# Patient Record
Sex: MALE | Race: WHITE | NOT HISPANIC OR LATINO | Employment: FULL TIME | URBAN - NONMETROPOLITAN AREA
[De-identification: names, ages, dates, MRNs, and addresses within clinical notes are randomized per-mention and may not be internally consistent; named-entity substitution may affect disease eponyms.]

---

## 2023-06-01 PROBLEM — J30.9 ALLERGIC RHINITIS, MILD: Status: ACTIVE | Noted: 2023-06-01

## 2023-06-01 PROBLEM — F41.9 MILD ANXIETY: Status: ACTIVE | Noted: 2023-06-01

## 2023-06-01 PROBLEM — U07.1 COVID-19 VIRUS INFECTION: Status: ACTIVE | Noted: 2023-06-01

## 2023-06-01 PROBLEM — Z86.0100 HISTORY OF COLON POLYPS: Status: ACTIVE | Noted: 2023-06-01

## 2023-06-01 PROBLEM — Z86.010 HISTORY OF COLON POLYPS: Status: ACTIVE | Noted: 2023-06-01

## 2023-06-01 PROBLEM — E78.5 DYSLIPIDEMIA: Status: ACTIVE | Noted: 2023-06-01

## 2023-06-01 PROBLEM — R43.0 ANOSMIA: Status: ACTIVE | Noted: 2023-06-01

## 2023-06-01 PROBLEM — E78.6 LOW HDL (UNDER 40): Status: ACTIVE | Noted: 2023-06-01

## 2023-06-01 PROBLEM — G47.00 INSOMNIA: Status: ACTIVE | Noted: 2023-06-01

## 2023-06-01 PROBLEM — Z86.39 H/O VITAMIN D DEFICIENCY: Status: ACTIVE | Noted: 2023-06-01

## 2023-06-01 PROBLEM — R41.3 MEMORY LOSS OR IMPAIRMENT: Status: ACTIVE | Noted: 2023-06-01

## 2023-06-01 RX ORDER — UBIQUINOL 100 MG
CAPSULE ORAL
COMMUNITY

## 2023-06-01 RX ORDER — FLUOXETINE HYDROCHLORIDE 40 MG/1
2 CAPSULE ORAL DAILY
COMMUNITY
Start: 2018-06-01 | End: 2023-08-16 | Stop reason: SDUPTHER

## 2023-06-01 RX ORDER — GARLIC 1000 MG
CAPSULE ORAL
COMMUNITY
End: 2024-04-03 | Stop reason: ALTCHOICE

## 2023-06-01 RX ORDER — RED YEAST RICE EXTRACT
POWDER (GRAM) MISCELLANEOUS
COMMUNITY
End: 2024-04-03 | Stop reason: ALTCHOICE

## 2023-06-01 RX ORDER — ACETAMINOPHEN 500 MG
TABLET ORAL
COMMUNITY
End: 2024-04-05

## 2023-06-02 ENCOUNTER — APPOINTMENT (OUTPATIENT)
Dept: PRIMARY CARE | Facility: CLINIC | Age: 43
End: 2023-06-02

## 2023-06-07 PROBLEM — R43.0 ANOSMIA: Status: RESOLVED | Noted: 2023-06-01 | Resolved: 2023-06-07

## 2023-06-07 PROBLEM — U07.1 COVID-19 VIRUS INFECTION: Status: RESOLVED | Noted: 2023-06-01 | Resolved: 2023-06-07

## 2023-06-07 ASSESSMENT — PROMIS GLOBAL HEALTH SCALE
RATE_AVERAGE_PAIN: 0
RATE_MENTAL_HEALTH: VERY GOOD
CARRYOUT_PHYSICAL_ACTIVITIES: COMPLETELY
RATE_QUALITY_OF_LIFE: EXCELLENT
RATE_AVERAGE_FATIGUE: MILD
RATE_PHYSICAL_HEALTH: EXCELLENT
RATE_GENERAL_HEALTH: EXCELLENT
RATE_SOCIAL_SATISFACTION: VERY GOOD
EMOTIONAL_PROBLEMS: RARELY
CARRYOUT_SOCIAL_ACTIVITIES: EXCELLENT

## 2023-06-08 ENCOUNTER — LAB (OUTPATIENT)
Dept: LAB | Facility: LAB | Age: 43
End: 2023-06-08
Payer: COMMERCIAL

## 2023-06-08 ENCOUNTER — OFFICE VISIT (OUTPATIENT)
Dept: PRIMARY CARE | Facility: CLINIC | Age: 43
End: 2023-06-08
Payer: COMMERCIAL

## 2023-06-08 VITALS
DIASTOLIC BLOOD PRESSURE: 68 MMHG | TEMPERATURE: 96.8 F | OXYGEN SATURATION: 98 % | HEIGHT: 73 IN | HEART RATE: 54 BPM | BODY MASS INDEX: 33.83 KG/M2 | WEIGHT: 255.3 LBS | SYSTOLIC BLOOD PRESSURE: 119 MMHG

## 2023-06-08 DIAGNOSIS — Z13.0 SCREENING, ANEMIA, DEFICIENCY, IRON: ICD-10-CM

## 2023-06-08 DIAGNOSIS — E78.6 LOW HDL (UNDER 40): ICD-10-CM

## 2023-06-08 DIAGNOSIS — Z00.00 PHYSICAL EXAM, ANNUAL: Primary | ICD-10-CM

## 2023-06-08 DIAGNOSIS — F41.9 MILD ANXIETY: ICD-10-CM

## 2023-06-08 DIAGNOSIS — E78.5 DYSLIPIDEMIA: ICD-10-CM

## 2023-06-08 DIAGNOSIS — Z86.010 HISTORY OF COLON POLYPS: ICD-10-CM

## 2023-06-08 DIAGNOSIS — Z00.00 PHYSICAL EXAM, ANNUAL: ICD-10-CM

## 2023-06-08 PROBLEM — R41.3 MEMORY LOSS OR IMPAIRMENT: Status: RESOLVED | Noted: 2023-06-01 | Resolved: 2023-06-08

## 2023-06-08 PROBLEM — Z86.39 H/O VITAMIN D DEFICIENCY: Status: RESOLVED | Noted: 2023-06-01 | Resolved: 2023-06-08

## 2023-06-08 LAB
ALANINE AMINOTRANSFERASE (SGPT) (U/L) IN SER/PLAS: 55 U/L (ref 10–52)
ALBUMIN (G/DL) IN SER/PLAS: 4.8 G/DL (ref 3.4–5)
ALKALINE PHOSPHATASE (U/L) IN SER/PLAS: 61 U/L (ref 33–120)
ANION GAP IN SER/PLAS: 14 MMOL/L (ref 10–20)
ASPARTATE AMINOTRANSFERASE (SGOT) (U/L) IN SER/PLAS: 22 U/L (ref 9–39)
BASOPHILS (10*3/UL) IN BLOOD BY AUTOMATED COUNT: 0.02 X10E9/L (ref 0–0.1)
BASOPHILS/100 LEUKOCYTES IN BLOOD BY AUTOMATED COUNT: 0.3 % (ref 0–2)
BILIRUBIN TOTAL (MG/DL) IN SER/PLAS: 0.7 MG/DL (ref 0–1.2)
CALCIUM (MG/DL) IN SER/PLAS: 10.1 MG/DL (ref 8.6–10.6)
CARBON DIOXIDE, TOTAL (MMOL/L) IN SER/PLAS: 28 MMOL/L (ref 21–32)
CHLORIDE (MMOL/L) IN SER/PLAS: 104 MMOL/L (ref 98–107)
CHOLESTEROL (MG/DL) IN SER/PLAS: 172 MG/DL (ref 0–199)
CHOLESTEROL IN HDL (MG/DL) IN SER/PLAS: 46 MG/DL
CHOLESTEROL/HDL RATIO: 3.7
CREATININE (MG/DL) IN SER/PLAS: 1.09 MG/DL (ref 0.5–1.3)
EOSINOPHILS (10*3/UL) IN BLOOD BY AUTOMATED COUNT: 0.12 X10E9/L (ref 0–0.7)
EOSINOPHILS/100 LEUKOCYTES IN BLOOD BY AUTOMATED COUNT: 1.9 % (ref 0–6)
ERYTHROCYTE DISTRIBUTION WIDTH (RATIO) BY AUTOMATED COUNT: 12.6 % (ref 11.5–14.5)
ERYTHROCYTE MEAN CORPUSCULAR HEMOGLOBIN CONCENTRATION (G/DL) BY AUTOMATED: 32.8 G/DL (ref 32–36)
ERYTHROCYTE MEAN CORPUSCULAR VOLUME (FL) BY AUTOMATED COUNT: 94 FL (ref 80–100)
ERYTHROCYTES (10*6/UL) IN BLOOD BY AUTOMATED COUNT: 5.6 X10E12/L (ref 4.5–5.9)
GFR MALE: 87 ML/MIN/1.73M2
GLUCOSE (MG/DL) IN SER/PLAS: 88 MG/DL (ref 74–99)
HEMATOCRIT (%) IN BLOOD BY AUTOMATED COUNT: 52.7 % (ref 41–52)
HEMOGLOBIN (G/DL) IN BLOOD: 17.3 G/DL (ref 13.5–17.5)
IMMATURE GRANULOCYTES/100 LEUKOCYTES IN BLOOD BY AUTOMATED COUNT: 0.2 % (ref 0–0.9)
LDL: 108 MG/DL (ref 0–99)
LEUKOCYTES (10*3/UL) IN BLOOD BY AUTOMATED COUNT: 6.2 X10E9/L (ref 4.4–11.3)
LYMPHOCYTES (10*3/UL) IN BLOOD BY AUTOMATED COUNT: 2.6 X10E9/L (ref 1.2–4.8)
LYMPHOCYTES/100 LEUKOCYTES IN BLOOD BY AUTOMATED COUNT: 41.7 % (ref 13–44)
MONOCYTES (10*3/UL) IN BLOOD BY AUTOMATED COUNT: 0.48 X10E9/L (ref 0.1–1)
MONOCYTES/100 LEUKOCYTES IN BLOOD BY AUTOMATED COUNT: 7.7 % (ref 2–10)
NEUTROPHILS (10*3/UL) IN BLOOD BY AUTOMATED COUNT: 3.01 X10E9/L (ref 1.2–7.7)
NEUTROPHILS/100 LEUKOCYTES IN BLOOD BY AUTOMATED COUNT: 48.2 % (ref 40–80)
NRBC (PER 100 WBCS) BY AUTOMATED COUNT: 0 /100 WBC (ref 0–0)
PLATELETS (10*3/UL) IN BLOOD AUTOMATED COUNT: 224 X10E9/L (ref 150–450)
POTASSIUM (MMOL/L) IN SER/PLAS: 5.2 MMOL/L (ref 3.5–5.3)
PROTEIN TOTAL: 7.1 G/DL (ref 6.4–8.2)
SODIUM (MMOL/L) IN SER/PLAS: 141 MMOL/L (ref 136–145)
TRIGLYCERIDE (MG/DL) IN SER/PLAS: 91 MG/DL (ref 0–149)
UREA NITROGEN (MG/DL) IN SER/PLAS: 12 MG/DL (ref 6–23)
VLDL: 18 MG/DL (ref 0–40)

## 2023-06-08 PROCEDURE — 99396 PREV VISIT EST AGE 40-64: CPT | Performed by: FAMILY MEDICINE

## 2023-06-08 PROCEDURE — 80061 LIPID PANEL: CPT

## 2023-06-08 PROCEDURE — 80053 COMPREHEN METABOLIC PANEL: CPT

## 2023-06-08 PROCEDURE — 1036F TOBACCO NON-USER: CPT | Performed by: FAMILY MEDICINE

## 2023-06-08 PROCEDURE — 36415 COLL VENOUS BLD VENIPUNCTURE: CPT

## 2023-06-08 PROCEDURE — 85025 COMPLETE CBC W/AUTO DIFF WBC: CPT

## 2023-06-08 RX ORDER — NEOMYCIN SULFATE, POLYMYXIN B SULFATE, AND DEXAMETHASONE 3.5; 10000; 1 MG/G; [USP'U]/G; MG/G
OINTMENT OPHTHALMIC
COMMUNITY
Start: 2023-06-07 | End: 2023-12-27 | Stop reason: WASHOUT

## 2023-06-08 RX ORDER — AMOXICILLIN AND CLAVULANATE POTASSIUM 875; 125 MG/1; MG/1
TABLET, FILM COATED ORAL
COMMUNITY
Start: 2023-06-07 | End: 2023-12-27 | Stop reason: WASHOUT

## 2023-06-08 RX ORDER — ROSUVASTATIN CALCIUM 5 MG/1
5 TABLET, COATED ORAL DAILY
COMMUNITY
Start: 2023-05-12 | End: 2023-07-19 | Stop reason: SDUPTHER

## 2023-06-08 ASSESSMENT — ENCOUNTER SYMPTOMS
BRUISES/BLEEDS EASILY: 0
PALPITATIONS: 0
HEMATURIA: 0
LIGHT-HEADEDNESS: 0
FEVER: 0
UNEXPECTED WEIGHT CHANGE: 0
ADENOPATHY: 0
COUGH: 0
SHORTNESS OF BREATH: 0
HEADACHES: 0
DYSURIA: 0

## 2023-06-08 ASSESSMENT — ANXIETY QUESTIONNAIRES
1. FEELING NERVOUS, ANXIOUS, OR ON EDGE: SEVERAL DAYS
5. BEING SO RESTLESS THAT IT IS HARD TO SIT STILL: NOT AT ALL
GAD7 TOTAL SCORE: 2
3. WORRYING TOO MUCH ABOUT DIFFERENT THINGS: NOT AT ALL
IF YOU CHECKED OFF ANY PROBLEMS ON THIS QUESTIONNAIRE, HOW DIFFICULT HAVE THESE PROBLEMS MADE IT FOR YOU TO DO YOUR WORK, TAKE CARE OF THINGS AT HOME, OR GET ALONG WITH OTHER PEOPLE: NOT DIFFICULT AT ALL
2. NOT BEING ABLE TO STOP OR CONTROL WORRYING: NOT AT ALL
7. FEELING AFRAID AS IF SOMETHING AWFUL MIGHT HAPPEN: NOT AT ALL
4. TROUBLE RELAXING: NOT AT ALL
6. BECOMING EASILY ANNOYED OR IRRITABLE: SEVERAL DAYS

## 2023-06-08 ASSESSMENT — PATIENT HEALTH QUESTIONNAIRE - PHQ9
2. FEELING DOWN, DEPRESSED OR HOPELESS: NOT AT ALL
SUM OF ALL RESPONSES TO PHQ9 QUESTIONS 1 AND 2: 0
1. LITTLE INTEREST OR PLEASURE IN DOING THINGS: NOT AT ALL

## 2023-06-08 NOTE — PROGRESS NOTES
" Subjective   Patient ID: Juan Ramirez is a 42 y.o. male who presents for Annual Exam and Follow-up.  HPI  Well visit  .  Follow up anxiety, Dyslipidemia .     Interval  Health : good.  Has a  current eyelid infx, and on topical andoral antibx. Much better in the last 24 hrs.     Interval Visits : none     He updated pmhx fmhx in [facility] [place] .      Patient Active Problem List   Diagnosis    Allergic rhinitis, mild    Dyslipidemia    History of colon polyps    Insomnia    Low HDL (under 40)    Mild anxiety     PSHx reviewed  FMHx reviewed      Review of Systems   Constitutional:  Negative for fever and unexpected weight change.   HENT:  Negative for dental problem.    Eyes:  Negative for visual disturbance.   Respiratory:  Negative for cough and shortness of breath.    Cardiovascular:  Negative for chest pain and palpitations.   Genitourinary:  Negative for dysuria and hematuria.   Skin:  Negative for rash.   Neurological:  Negative for syncope, light-headedness and headaches.   Hematological:  Negative for adenopathy. Does not bruise/bleed easily.     Brain fog/ memoryimpairmentresolved  Taking statin,no issues     Interviewingfor a new position, work . Exercise- none; walks his dog .    Objective   /68 (BP Location: Right arm, Patient Position: Sitting, BP Cuff Size: Large adult)   Pulse 54   Temp 36 °C (96.8 °F) (Temporal)   Ht 1.854 m (6' 1\")   Wt 116 kg (255 lb 4.8 oz)   SpO2 98%   BMI 33.68 kg/m²     Physical Exam  Vitals and nursing note reviewed.   Constitutional:       General: He is not in acute distress.     Appearance: Normal appearance.   Eyes:      Pupils: Pupils are equal, round, and reactive to light.      Comments: Erythema and swelling, right upper eyelid    Cardiovascular:      Rate and Rhythm: Normal rate and regular rhythm.      Heart sounds: Normal heart sounds.   Pulmonary:      Breath sounds: Normal breath sounds.   Abdominal:      General: Bowel sounds are normal.      Palpations: " Abdomen is soft.   Musculoskeletal:         General: Normal range of motion.      Cervical back: Neck supple. No rigidity or tenderness.   Neurological:      General: No focal deficit present.      Mental Status: He is alert and oriented to person, place, and time.      Cranial Nerves: No cranial nerve deficit.   Psychiatric:         Mood and Affect: Mood normal.         Behavior: Behavior normal.         Thought Content: Thought content normal.         Assessment/Plan   Problem List Items Addressed This Visit          Medium    Dyslipidemia    Relevant Orders    Lipid panel    Comprehensive Metabolic Panel    History of colon polyps    Low HDL (under 40)    Relevant Orders    Lipid panel    Mild anxiety     Other Visit Diagnoses       Physical exam, annual    -  Primary    Relevant Orders    Follow Up In [facility] - PCP    Comprehensive Metabolic Panel    Screening, anemia, deficiency, iron        Relevant Orders    CBC and Auto Differential             Well adult in good health.  Labwork due.will adjust statin if needed.     Anxiety- in remission - continue current medication .     Hx polyps  - utd on surveillance.     FADY Calderon MD

## 2023-07-19 DIAGNOSIS — E78.5 DYSLIPIDEMIA: Primary | ICD-10-CM

## 2023-07-19 RX ORDER — ROSUVASTATIN CALCIUM 5 MG/1
5 TABLET, COATED ORAL DAILY
Qty: 90 TABLET | Refills: 1 | Status: SHIPPED | OUTPATIENT
Start: 2023-07-19 | End: 2024-02-19

## 2023-08-16 DIAGNOSIS — F41.9 MILD ANXIETY: Primary | ICD-10-CM

## 2023-08-16 RX ORDER — FLUOXETINE HYDROCHLORIDE 40 MG/1
80 CAPSULE ORAL DAILY
Qty: 180 CAPSULE | Refills: 1 | Status: SHIPPED | OUTPATIENT
Start: 2023-08-16 | End: 2024-05-24

## 2023-12-08 ENCOUNTER — APPOINTMENT (OUTPATIENT)
Dept: PRIMARY CARE | Facility: CLINIC | Age: 43
End: 2023-12-08
Payer: COMMERCIAL

## 2023-12-27 ENCOUNTER — OFFICE VISIT (OUTPATIENT)
Dept: PRIMARY CARE | Facility: CLINIC | Age: 43
End: 2023-12-27
Payer: COMMERCIAL

## 2023-12-27 VITALS
DIASTOLIC BLOOD PRESSURE: 77 MMHG | BODY MASS INDEX: 37.23 KG/M2 | TEMPERATURE: 98.1 F | SYSTOLIC BLOOD PRESSURE: 123 MMHG | HEART RATE: 74 BPM | OXYGEN SATURATION: 93 % | WEIGHT: 282.2 LBS

## 2023-12-27 DIAGNOSIS — E78.6 LOW HDL (UNDER 40): ICD-10-CM

## 2023-12-27 DIAGNOSIS — Z00.00 PHYSICAL EXAM, ANNUAL: ICD-10-CM

## 2023-12-27 DIAGNOSIS — E78.5 DYSLIPIDEMIA: ICD-10-CM

## 2023-12-27 DIAGNOSIS — F41.9 MILD ANXIETY: Primary | ICD-10-CM

## 2023-12-27 PROCEDURE — 1036F TOBACCO NON-USER: CPT | Performed by: FAMILY MEDICINE

## 2023-12-27 PROCEDURE — 99213 OFFICE O/P EST LOW 20 MIN: CPT | Performed by: FAMILY MEDICINE

## 2023-12-27 ASSESSMENT — ANXIETY QUESTIONNAIRES
7. FEELING AFRAID AS IF SOMETHING AWFUL MIGHT HAPPEN: NOT AT ALL
1. FEELING NERVOUS, ANXIOUS, OR ON EDGE: SEVERAL DAYS
6. BECOMING EASILY ANNOYED OR IRRITABLE: NOT AT ALL
GAD7 TOTAL SCORE: 1
4. TROUBLE RELAXING: NOT AT ALL
2. NOT BEING ABLE TO STOP OR CONTROL WORRYING: NOT AT ALL
5. BEING SO RESTLESS THAT IT IS HARD TO SIT STILL: NOT AT ALL
3. WORRYING TOO MUCH ABOUT DIFFERENT THINGS: NOT AT ALL
IF YOU CHECKED OFF ANY PROBLEMS ON THIS QUESTIONNAIRE, HOW DIFFICULT HAVE THESE PROBLEMS MADE IT FOR YOU TO DO YOUR WORK, TAKE CARE OF THINGS AT HOME, OR GET ALONG WITH OTHER PEOPLE: SOMEWHAT DIFFICULT

## 2023-12-27 ASSESSMENT — PATIENT HEALTH QUESTIONNAIRE - PHQ9
SUM OF ALL RESPONSES TO PHQ9 QUESTIONS 1 AND 2: 1
1. LITTLE INTEREST OR PLEASURE IN DOING THINGS: NOT AT ALL
4. FEELING TIRED OR HAVING LITTLE ENERGY: SEVERAL DAYS
SUM OF ALL RESPONSES TO PHQ QUESTIONS 1-9: 3
3. TROUBLE FALLING OR STAYING ASLEEP OR SLEEPING TOO MUCH: NOT AT ALL
8. MOVING OR SPEAKING SO SLOWLY THAT OTHER PEOPLE COULD HAVE NOTICED. OR THE OPPOSITE, BEING SO FIGETY OR RESTLESS THAT YOU HAVE BEEN MOVING AROUND A LOT MORE THAN USUAL: NOT AT ALL
2. FEELING DOWN, DEPRESSED OR HOPELESS: SEVERAL DAYS
6. FEELING BAD ABOUT YOURSELF - OR THAT YOU ARE A FAILURE OR HAVE LET YOURSELF OR YOUR FAMILY DOWN: NOT AT ALL
7. TROUBLE CONCENTRATING ON THINGS, SUCH AS READING THE NEWSPAPER OR WATCHING TELEVISION: NOT AT ALL
5. POOR APPETITE OR OVEREATING: SEVERAL DAYS
10. IF YOU CHECKED OFF ANY PROBLEMS, HOW DIFFICULT HAVE THESE PROBLEMS MADE IT FOR YOU TO DO YOUR WORK, TAKE CARE OF THINGS AT HOME, OR GET ALONG WITH OTHER PEOPLE: SOMEWHAT DIFFICULT
9. THOUGHTS THAT YOU WOULD BE BETTER OFF DEAD, OR OF HURTING YOURSELF: NOT AT ALL

## 2023-12-27 NOTE — PROGRESS NOTES
Subjective   Patient ID: Juan Ramirez is a 43 y.o. male who presents for Anxiety, Depression, and Flu Vaccine (Pt refused flu shot ).  HPI    Pt here for 6 month visit.      Interval Health : has been well .      Interval Changes in PMHx. PSHx, FMHx : mom has had a couple hospitalizations for infections  one was MRSA .     Concerns/Questions:      Review of Systems  RONALDO-7 Total Score: 1 (12/27/23 1017)  Patient Health Questionnaire-9 Score: 3 (12/27/23 1015)  Patient Health Questionnaire-2 Score: 1 (12/27/23 1015)     Objective   /77 (BP Location: Right arm, Patient Position: Sitting, BP Cuff Size: Large adult)   Pulse 74   Temp 36.7 °C (98.1 °F) (Temporal)   Wt 128 kg (282 lb 3.2 oz)   SpO2 93%   BMI 37.23 kg/m²     Physical Exam  Vitals reviewed.   Constitutional:       General: He is not in acute distress.  Cardiovascular:      Rate and Rhythm: Normal rate and regular rhythm.      Heart sounds: Normal heart sounds.   Pulmonary:      Effort: Pulmonary effort is normal.      Breath sounds: No wheezing or rales.   Neurological:      General: No focal deficit present.      Mental Status: He is alert.         Current Outpatient Medications   Medication Sig Dispense Refill    cholecalciferol (Vitamin D-3) 50 mcg (2,000 unit) capsule Take by mouth.      FLUoxetine (PROzac) 40 mg capsule Take 2 capsules (80 mg) by mouth once daily. 180 capsule 1    garlic 1,000 mg capsule Take by mouth.      glucosamine HCl 750 mg tablet Take by mouth.      psyllium seed, with sugar, (FIBER ORAL) Take by mouth.      red yeast rice extract, bulk, powder Red Yeast Rice Powder   Refills: 0       Active      rosuvastatin (Crestor) 5 mg tablet Take 1 tablet (5 mg) by mouth once daily. 90 tablet 1    ubidecarenone (COENZYME Q10, BULK, MISC) Take by mouth.       No current facility-administered medications for this visit.       Assessment/Plan   Problem List Items Addressed This Visit          Medium    Dyslipidemia    Relevant  Orders    Lipid Panel    Low HDL (under 40)    Relevant Orders    Lipid Panel    Mild anxiety - Primary     Other Visit Diagnoses       Physical exam, annual        Relevant Orders    CBC and Auto Differential    Comprehensive Metabolic Panel    Vitamin D 25-Hydroxy,Total (for eval of Vitamin D levels)    Follow Up In [facility] - PCP - Health Maintenance            Doing well,  6 month appt with labs. Would recommend decrease SSRI to 40 mg  , can do this about a month before our next appt .   Sxs are wellmanaged/ in remission . Pt open to this, will think about trying .       FADY Calderon MD

## 2024-02-19 DIAGNOSIS — E78.5 DYSLIPIDEMIA: ICD-10-CM

## 2024-02-19 RX ORDER — ROSUVASTATIN CALCIUM 5 MG/1
5 TABLET, COATED ORAL DAILY
Qty: 90 TABLET | Refills: 1 | Status: SHIPPED | OUTPATIENT
Start: 2024-02-19

## 2024-04-04 ENCOUNTER — LAB (OUTPATIENT)
Dept: LAB | Facility: LAB | Age: 44
End: 2024-04-04
Payer: COMMERCIAL

## 2024-04-04 ENCOUNTER — OFFICE VISIT (OUTPATIENT)
Dept: PRIMARY CARE | Facility: CLINIC | Age: 44
End: 2024-04-04
Payer: COMMERCIAL

## 2024-04-04 VITALS
DIASTOLIC BLOOD PRESSURE: 84 MMHG | HEART RATE: 90 BPM | SYSTOLIC BLOOD PRESSURE: 138 MMHG | BODY MASS INDEX: 36.2 KG/M2 | WEIGHT: 274.4 LBS | TEMPERATURE: 97.4 F | OXYGEN SATURATION: 95 %

## 2024-04-04 DIAGNOSIS — Z00.00 PHYSICAL EXAM, ANNUAL: ICD-10-CM

## 2024-04-04 DIAGNOSIS — E78.5 DYSLIPIDEMIA: ICD-10-CM

## 2024-04-04 DIAGNOSIS — E66.01 CLASS 2 SEVERE OBESITY WITH SERIOUS COMORBIDITY AND BODY MASS INDEX (BMI) OF 39.0 TO 39.9 IN ADULT, UNSPECIFIED OBESITY TYPE (MULTI): Primary | ICD-10-CM

## 2024-04-04 DIAGNOSIS — E78.6 LOW HDL (UNDER 40): ICD-10-CM

## 2024-04-04 LAB
25(OH)D3 SERPL-MCNC: 29 NG/ML (ref 30–100)
BASOPHILS # BLD AUTO: 0.03 X10*3/UL (ref 0–0.1)
BASOPHILS NFR BLD AUTO: 0.5 %
EOSINOPHIL # BLD AUTO: 0.15 X10*3/UL (ref 0–0.7)
EOSINOPHIL NFR BLD AUTO: 2.7 %
ERYTHROCYTE [DISTWIDTH] IN BLOOD BY AUTOMATED COUNT: 13 % (ref 11.5–14.5)
HCT VFR BLD AUTO: 49.3 % (ref 41–52)
HGB BLD-MCNC: 16.8 G/DL (ref 13.5–17.5)
IMM GRANULOCYTES # BLD AUTO: 0.01 X10*3/UL (ref 0–0.7)
IMM GRANULOCYTES NFR BLD AUTO: 0.2 % (ref 0–0.9)
LYMPHOCYTES # BLD AUTO: 1.97 X10*3/UL (ref 1.2–4.8)
LYMPHOCYTES NFR BLD AUTO: 35.2 %
MCH RBC QN AUTO: 31.3 PG (ref 26–34)
MCHC RBC AUTO-ENTMCNC: 34.1 G/DL (ref 32–36)
MCV RBC AUTO: 92 FL (ref 80–100)
MONOCYTES # BLD AUTO: 0.35 X10*3/UL (ref 0.1–1)
MONOCYTES NFR BLD AUTO: 6.3 %
NEUTROPHILS # BLD AUTO: 3.09 X10*3/UL (ref 1.2–7.7)
NEUTROPHILS NFR BLD AUTO: 55.1 %
NRBC BLD-RTO: 0 /100 WBCS (ref 0–0)
PLATELET # BLD AUTO: 205 X10*3/UL (ref 150–450)
RBC # BLD AUTO: 5.36 X10*6/UL (ref 4.5–5.9)
WBC # BLD AUTO: 5.6 X10*3/UL (ref 4.4–11.3)

## 2024-04-04 PROCEDURE — 80061 LIPID PANEL: CPT

## 2024-04-04 PROCEDURE — 82306 VITAMIN D 25 HYDROXY: CPT

## 2024-04-04 PROCEDURE — 36415 COLL VENOUS BLD VENIPUNCTURE: CPT

## 2024-04-04 PROCEDURE — 99213 OFFICE O/P EST LOW 20 MIN: CPT | Performed by: FAMILY MEDICINE

## 2024-04-04 PROCEDURE — 3008F BODY MASS INDEX DOCD: CPT | Performed by: FAMILY MEDICINE

## 2024-04-04 PROCEDURE — 80053 COMPREHEN METABOLIC PANEL: CPT

## 2024-04-04 PROCEDURE — 85025 COMPLETE CBC W/AUTO DIFF WBC: CPT

## 2024-04-04 NOTE — PROGRESS NOTES
Subjective   Patient ID: Juan Ramirez is a 43 y.o. male who presents for Weight loss Medications (Pt states that his BMI is over 30 and he has high cholesterol and would like to lose the weight. Needs script printed because he assumes he will need to pay for it out of pocket and needs to be able to pharmacy shop. ).  HPI    Concern about wt  .   Elevated Chol.   Goal- 20%   of current wt .  Wants to feel , look better.      At prev wt felt more energy . Clothes fit better .     Activity level-   bike,walk , jog . Active/ exercise  most days of the week   Diet changes - higher protein diet      Cravings and Hunger,sev  times a day   - over the last year   - craving for  sweet foods.     Review of Systems  No constipation/ diarrhea  No hx of thyroid dz .   No hx of pancreas issues/ dz     Objective   /84 (BP Location: Left arm, Patient Position: Sitting, BP Cuff Size: Large adult)   Pulse 90   Temp 36.3 °C (97.4 °F) (Temporal)   Wt 124 kg (274 lb 6.4 oz)   SpO2 95%   BMI 36.20 kg/m²     Physical Exam  Alert. No distress.   BMI high   Assessment/Plan   Problem List Items Addressed This Visit    None  Visit Diagnoses       Class 2 severe obesity with serious comorbidity and body mass index (BMI) of 39.0 to 39.9 in adult, unspecified obesity type (CMS/HCC)    -  Primary    Relevant Orders    Follow Up In [facility] - Pharmacy          Obesity with co morbidity of hyperlipidemia .  Has not had results with appropriate diet and fitness regimen over the last year, in fact has gained wt .     Would benefit from medication to help with wt loss efforts  .    He has reviewed meds and his insurance  .  I will request help from our clinical pharmacist.     If/when started on med,  I recommend 1 m [facility] follow up ,  for wt measurement  and med adjustment .   Followup not yet scheduled, will depend on when he starts med.     Advised pt to get updated labwork . States he is fasting today  .    FADY Calderon MD

## 2024-04-05 ENCOUNTER — PATIENT MESSAGE (OUTPATIENT)
Dept: PRIMARY CARE | Facility: CLINIC | Age: 44
End: 2024-04-05
Payer: COMMERCIAL

## 2024-04-05 DIAGNOSIS — E55.9 VITAMIN D INSUFFICIENCY: Primary | ICD-10-CM

## 2024-04-05 LAB
ALBUMIN SERPL BCP-MCNC: 4.7 G/DL (ref 3.4–5)
ALP SERPL-CCNC: 61 U/L (ref 33–120)
ALT SERPL W P-5'-P-CCNC: 65 U/L (ref 10–52)
ANION GAP SERPL CALC-SCNC: 14 MMOL/L (ref 10–20)
AST SERPL W P-5'-P-CCNC: 23 U/L (ref 9–39)
BILIRUB SERPL-MCNC: 0.5 MG/DL (ref 0–1.2)
BUN SERPL-MCNC: 10 MG/DL (ref 6–23)
CALCIUM SERPL-MCNC: 9.9 MG/DL (ref 8.6–10.6)
CHLORIDE SERPL-SCNC: 104 MMOL/L (ref 98–107)
CHOLEST SERPL-MCNC: 195 MG/DL (ref 0–199)
CHOLESTEROL/HDL RATIO: 4.1
CO2 SERPL-SCNC: 29 MMOL/L (ref 21–32)
CREAT SERPL-MCNC: 0.92 MG/DL (ref 0.5–1.3)
EGFRCR SERPLBLD CKD-EPI 2021: >90 ML/MIN/1.73M*2
GLUCOSE SERPL-MCNC: 90 MG/DL (ref 74–99)
HDLC SERPL-MCNC: 47.8 MG/DL
LDLC SERPL CALC-MCNC: 111 MG/DL
NON HDL CHOLESTEROL: 147 MG/DL (ref 0–149)
POTASSIUM SERPL-SCNC: 4.8 MMOL/L (ref 3.5–5.3)
PROT SERPL-MCNC: 6.8 G/DL (ref 6.4–8.2)
SODIUM SERPL-SCNC: 142 MMOL/L (ref 136–145)
TRIGL SERPL-MCNC: 183 MG/DL (ref 0–149)
VLDL: 37 MG/DL (ref 0–40)

## 2024-04-05 RX ORDER — CHOLECALCIFEROL (VITAMIN D3) 1250 MCG
50000 TABLET ORAL
Qty: 4 TABLET | Refills: 2 | Status: SHIPPED | OUTPATIENT
Start: 2024-04-07 | End: 2024-06-30

## 2024-04-15 ENCOUNTER — TELEMEDICINE (OUTPATIENT)
Dept: PHARMACY | Facility: HOSPITAL | Age: 44
End: 2024-04-15
Payer: COMMERCIAL

## 2024-04-15 DIAGNOSIS — E66.01 CLASS 2 SEVERE OBESITY WITH SERIOUS COMORBIDITY AND BODY MASS INDEX (BMI) OF 39.0 TO 39.9 IN ADULT, UNSPECIFIED OBESITY TYPE (MULTI): ICD-10-CM

## 2024-04-15 PROBLEM — E66.812 CLASS 2 SEVERE OBESITY WITH SERIOUS COMORBIDITY AND BODY MASS INDEX (BMI) OF 39.0 TO 39.9 IN ADULT: Status: ACTIVE | Noted: 2024-04-15

## 2024-04-15 PROCEDURE — RXMED WILLOW AMBULATORY MEDICATION CHARGE

## 2024-04-15 NOTE — PROGRESS NOTES
"      Patient ID: Juan Ramirez is a 43 y.o. male who presents for Weight Loss.    Referring Provider: Yamila Calderon MD  PCP: Yamila Calderon MD     Last visit with PCP: 4/4/2024   Next visit with PCP: 6/27/2024      Subjective     Starting Weight: 275 lbs   Current Weight: 275 lbs     HPI    -The 10-year ASCVD risk score (Juan NUNO, et al., 2019) is: 1.6%    Values used to calculate the score:      Age: 43 years      Sex: Male      Is Non- : No      Diabetic: No      Tobacco smoker: No      Systolic Blood Pressure: 138 mmHg      Is BP treated: No      HDL Cholesterol: 47.8 mg/dL      Total Cholesterol: 195 mg/dL     Pertinent PMH Review:  - PMH of Pancreatitis: No  - PMH/FH of Medullary Thyroid Cancer: No  - PMH of Retinopathy: No    Review of Systems      Objective     There were no vitals taken for this visit.   BP Readings from Last 4 Encounters:   04/04/24 138/84   12/27/23 123/77   06/08/23 119/68   12/02/22 162/80      There were no vitals filed for this visit.     Labs  Lab Results   Component Value Date    BILITOT 0.5 04/04/2024    CALCIUM 9.9 04/04/2024    CO2 29 04/04/2024     04/04/2024    CREATININE 0.92 04/04/2024    GLUCOSE 90 04/04/2024    ALKPHOS 61 04/04/2024    K 4.8 04/04/2024    PROT 6.8 04/04/2024     04/04/2024    AST 23 04/04/2024    ALT 65 (H) 04/04/2024    BUN 10 04/04/2024    ANIONGAP 14 04/04/2024    ALBUMIN 4.7 04/04/2024    GFRMALE 87 06/08/2023     Lab Results   Component Value Date    TRIG 183 (H) 04/04/2024    CHOL 195 04/04/2024    LDLCALC 111 (H) 04/04/2024    HDL 47.8 04/04/2024     No results found for: \"HGBA1C\"    Current Outpatient Medications   Medication Instructions    cholecalciferol (VITAMIN D3) 50,000 Units, oral, Once Weekly    FLUoxetine (PROZAC) 80 mg, oral, Daily    glucosamine HCl 750 mg tablet oral    rosuvastatin (CRESTOR) 5 mg, oral, Daily    tirzepatide (weight loss) (ZEPBOUND) 2.5 mg, subcutaneous, Every 7 days    ubidecarenone " (COENZYME Q10, BULK, MISC) oral       Drug Interactions;  None at time of review    Assessment/Plan   Problem List Items Addressed This Visit       Class 2 severe obesity with serious comorbidity and body mass index (BMI) of 39.0 to 39.9 in adult (Multi)     Patient has a BMI of 36 and was referred for initiation of a GLP- 1 agonist for weight loss.  Discussed options available including Wegovy, Zepbound, and Saxenda.  Patient's insurance does not cover weight loss injectable medications, however patient would still like to start the medication and use a copay card to help with cost.      Zepbound Education:     - Counseled patient on MOA, expectations, side effects, duration of therapy, contraindications, administration, and monitoring parameters  - Answered all patient questions and concerns  - Counseled patient on Zepbound MOA, expectations, side effects, duration of therapy, administration, and monitoring parameters.  - Provided detailed dosing and administration counseling to ensure proper technique.   - Reviewed Zepbound titration schedule, starting with 2.5 mg once weekly to a goal of 15 mg once weekly if tolerated  - Counseled patient on the benefits of GLP-1ra glycemic control and weight loss  - Reviewed storage requirements of Zepbound when not in use, and when to administer the medication if a dose is missed.  - Advised patient that they may experience improved satiety after meals and portion sizes of meals may be reduced as doses of Zepbound increase.      PLAN:   - Start Zepbound 2.5 mg once weekly injection.  Prescription sent to [facility] pharmacy to be mailed to patient   - Continue diet and lifestyle changes to assist with weight loss         Relevant Medications    tirzepatide, weight loss, (Zepbound) 2.5 mg/0.5 mL injection    Other Relevant Orders    Follow Up In [facility] - Pharmacy     Follow-up: 3 weeks     Tanja Mclain, PharmD    Continue all meds under the continuation of  care with the referring provider and clinical pharmacy team.

## 2024-04-15 NOTE — ASSESSMENT & PLAN NOTE
Patient has a BMI of 36 and was referred for initiation of a GLP- 1 agonist for weight loss.  Discussed options available including Wegovy, Zepbound, and Saxenda.  Patient's insurance does not cover weight loss injectable medications, however patient would still like to start the medication and use a copay card to help with cost.      Zepbound Education:     - Counseled patient on MOA, expectations, side effects, duration of therapy, contraindications, administration, and monitoring parameters  - Answered all patient questions and concerns  - Counseled patient on Zepbound MOA, expectations, side effects, duration of therapy, administration, and monitoring parameters.  - Provided detailed dosing and administration counseling to ensure proper technique.   - Reviewed Zepbound titration schedule, starting with 2.5 mg once weekly to a goal of 15 mg once weekly if tolerated  - Counseled patient on the benefits of GLP-1ra glycemic control and weight loss  - Reviewed storage requirements of Zepbound when not in use, and when to administer the medication if a dose is missed.  - Advised patient that they may experience improved satiety after meals and portion sizes of meals may be reduced as doses of Zepbound increase.      PLAN:   - Start Zepbound 2.5 mg once weekly injection.  Prescription sent to [facility] pharmacy to be mailed to patient   - Continue diet and lifestyle changes to assist with weight loss

## 2024-04-19 ENCOUNTER — PHARMACY VISIT (OUTPATIENT)
Dept: PHARMACY | Facility: CLINIC | Age: 44
End: 2024-04-19
Payer: COMMERCIAL

## 2024-05-07 ENCOUNTER — APPOINTMENT (OUTPATIENT)
Dept: PHARMACY | Facility: HOSPITAL | Age: 44
End: 2024-05-07
Payer: COMMERCIAL

## 2024-05-14 ENCOUNTER — TELEMEDICINE (OUTPATIENT)
Dept: PHARMACY | Facility: HOSPITAL | Age: 44
End: 2024-05-14
Payer: COMMERCIAL

## 2024-05-14 DIAGNOSIS — E66.01 CLASS 2 SEVERE OBESITY WITH SERIOUS COMORBIDITY AND BODY MASS INDEX (BMI) OF 39.0 TO 39.9 IN ADULT, UNSPECIFIED OBESITY TYPE (MULTI): ICD-10-CM

## 2024-05-14 PROCEDURE — RXMED WILLOW AMBULATORY MEDICATION CHARGE

## 2024-05-14 NOTE — PROGRESS NOTES
"      Patient ID: Juan Ramirez is a 43 y.o. male who presents for Weight Loss.    Referring Provider: Yamila Calderon MD  PCP: Yamila Calderon MD     Last visit with PCP: 4/4/2024   Next visit with PCP: 6/27/2024      Subjective     Starting Weight: 275 lbs   Current Weight: 275 lbs     HPI    -The 10-year ASCVD risk score (Juan NUNO, et al., 2019) is: 1.6%    Values used to calculate the score:      Age: 43 years      Sex: Male      Is Non- : No      Diabetic: No      Tobacco smoker: No      Systolic Blood Pressure: 138 mmHg      Is BP treated: No      HDL Cholesterol: 47.8 mg/dL      Total Cholesterol: 195 mg/dL     Pertinent PMH Review:  - PMH of Pancreatitis: No  - PMH/FH of Medullary Thyroid Cancer: No  - PMH of Retinopathy: No    Review of Systems      Objective     There were no vitals taken for this visit.   BP Readings from Last 4 Encounters:   04/04/24 138/84   12/27/23 123/77   06/08/23 119/68   12/02/22 162/80      There were no vitals filed for this visit.     Labs  Lab Results   Component Value Date    BILITOT 0.5 04/04/2024    CALCIUM 9.9 04/04/2024    CO2 29 04/04/2024     04/04/2024    CREATININE 0.92 04/04/2024    GLUCOSE 90 04/04/2024    ALKPHOS 61 04/04/2024    K 4.8 04/04/2024    PROT 6.8 04/04/2024     04/04/2024    AST 23 04/04/2024    ALT 65 (H) 04/04/2024    BUN 10 04/04/2024    ANIONGAP 14 04/04/2024    ALBUMIN 4.7 04/04/2024    GFRMALE 87 06/08/2023     Lab Results   Component Value Date    TRIG 183 (H) 04/04/2024    CHOL 195 04/04/2024    LDLCALC 111 (H) 04/04/2024    HDL 47.8 04/04/2024     No results found for: \"HGBA1C\"    Current Outpatient Medications   Medication Instructions    cholecalciferol (VITAMIN D3) 50,000 Units, oral, Once Weekly    FLUoxetine (PROZAC) 80 mg, oral, Daily    glucosamine HCl 750 mg tablet oral    rosuvastatin (CRESTOR) 5 mg, oral, Daily    tirzepatide (weight loss) (ZEPBOUND) 2.5 mg, subcutaneous, Every 7 days    ubidecarenone " (COENZYME Q10, BULK, MISC) oral       Drug Interactions;  None at time of review    Assessment/Plan   Problem List Items Addressed This Visit       Class 2 severe obesity with serious comorbidity and body mass index (BMI) of 39.0 to 39.9 in adult (Multi)     Patient has a BMI of 36 and was referred for initiation of a GLP- 1 agonist for weight loss.  Discussed options available including Wegovy, Zepbound, and Saxenda.  Patient's insurance does not cover weight loss injectable medications, however patient would still like to start the medication and use a copay card to help with cost.  Patient started Zepbound and reports no side effects but a decrease in appetite.  Discussed increasing the dose and patient was agreeable.        PLAN:   - INCREASE to Zepbound 5 mg once weekly injection.  Prescription sent to [facility] pharmacy to be mailed to patient   - Continue diet and lifestyle changes to assist with weight loss         Relevant Medications    tirzepatide, weight loss, (Zepbound) 2.5 mg/0.5 mL injection    Other Relevant Orders    Follow Up In [facility] - Pharmacy     Follow-up: 2 weeks     Tanja Mclain, PharmD    Continue all meds under the continuation of care with the referring provider and clinical pharmacy team.

## 2024-05-17 ENCOUNTER — PHARMACY VISIT (OUTPATIENT)
Dept: PHARMACY | Facility: CLINIC | Age: 44
End: 2024-05-17
Payer: COMMERCIAL

## 2024-05-23 DIAGNOSIS — F41.9 MILD ANXIETY: ICD-10-CM

## 2024-05-24 RX ORDER — FLUOXETINE HYDROCHLORIDE 40 MG/1
80 CAPSULE ORAL DAILY
Qty: 180 CAPSULE | Refills: 1 | Status: SHIPPED | OUTPATIENT
Start: 2024-05-24

## 2024-05-28 ENCOUNTER — TELEMEDICINE (OUTPATIENT)
Dept: PHARMACY | Facility: HOSPITAL | Age: 44
End: 2024-05-28
Payer: COMMERCIAL

## 2024-05-28 ENCOUNTER — PHARMACY VISIT (OUTPATIENT)
Dept: PHARMACY | Facility: CLINIC | Age: 44
End: 2024-05-28
Payer: COMMERCIAL

## 2024-05-28 DIAGNOSIS — E66.01 CLASS 2 SEVERE OBESITY WITH SERIOUS COMORBIDITY AND BODY MASS INDEX (BMI) OF 39.0 TO 39.9 IN ADULT, UNSPECIFIED OBESITY TYPE (MULTI): ICD-10-CM

## 2024-05-28 PROCEDURE — RXMED WILLOW AMBULATORY MEDICATION CHARGE

## 2024-05-28 NOTE — ASSESSMENT & PLAN NOTE
Patient has a BMI of 36 and was referred for initiation of a GLP- 1 agonist for weight loss.  Discussed options available including Wegovy, Zepbound, and Saxenda.  Patient's insurance does not cover weight loss injectable medications, however patient would still like to start the medication and use a copay card to help with cost.  Patient started increased dose of  Zepbound and reports no side effects but a decrease in appetite.  Discussed increasing the dose and patient was agreeable.        PLAN:   - INCREASE to Zepbound 7.5 mg once weekly injection.  Prescription sent to [facility] pharmacy to be mailed to patient   - Continue diet and lifestyle changes to assist with weight loss

## 2024-05-28 NOTE — PROGRESS NOTES
"      Patient ID: Juan Ramirez is a 43 y.o. male who presents for Weight Loss.    Referring Provider: Yamila Calderon MD  PCP: Yamila Calderon MD     Last visit with PCP: 4/4/2024   Next visit with PCP: 6/27/2024      Subjective     Starting Weight: 275 lbs   Current Weight: 255 lbs     HPI    -The 10-year ASCVD risk score (Juan NUNO, et al., 2019) is: 1.6%    Values used to calculate the score:      Age: 43 years      Sex: Male      Is Non- : No      Diabetic: No      Tobacco smoker: No      Systolic Blood Pressure: 138 mmHg      Is BP treated: No      HDL Cholesterol: 47.8 mg/dL      Total Cholesterol: 195 mg/dL     Pertinent PMH Review:  - PMH of Pancreatitis: No  - PMH/FH of Medullary Thyroid Cancer: No  - PMH of Retinopathy: No    Review of Systems      Objective     There were no vitals taken for this visit.   BP Readings from Last 4 Encounters:   04/04/24 138/84   12/27/23 123/77   06/08/23 119/68   12/02/22 162/80      There were no vitals filed for this visit.     Labs  Lab Results   Component Value Date    BILITOT 0.5 04/04/2024    CALCIUM 9.9 04/04/2024    CO2 29 04/04/2024     04/04/2024    CREATININE 0.92 04/04/2024    GLUCOSE 90 04/04/2024    ALKPHOS 61 04/04/2024    K 4.8 04/04/2024    PROT 6.8 04/04/2024     04/04/2024    AST 23 04/04/2024    ALT 65 (H) 04/04/2024    BUN 10 04/04/2024    ANIONGAP 14 04/04/2024    ALBUMIN 4.7 04/04/2024    GFRMALE 87 06/08/2023     Lab Results   Component Value Date    TRIG 183 (H) 04/04/2024    CHOL 195 04/04/2024    LDLCALC 111 (H) 04/04/2024    HDL 47.8 04/04/2024     No results found for: \"HGBA1C\"    Current Outpatient Medications   Medication Instructions    cholecalciferol (VITAMIN D3) 50,000 Units, oral, Once Weekly    FLUoxetine (PROZAC) 80 mg, oral, Daily    glucosamine HCl 750 mg tablet oral    rosuvastatin (CRESTOR) 5 mg, oral, Daily    tirzepatide (weight loss) (ZEPBOUND) 7.5 mg, subcutaneous, Every 7 days    ubidecarenone " (COENZYME Q10, BULK, MISC) oral       Drug Interactions;  None at time of review    Assessment/Plan   Problem List Items Addressed This Visit       Class 2 severe obesity with serious comorbidity and body mass index (BMI) of 39.0 to 39.9 in adult (Multi)     Patient has a BMI of 36 and was referred for initiation of a GLP- 1 agonist for weight loss.  Discussed options available including Wegovy, Zepbound, and Saxenda.  Patient's insurance does not cover weight loss injectable medications, however patient would still like to start the medication and use a copay card to help with cost.  Patient started increased dose of  Zepbound and reports no side effects but a decrease in appetite.  Discussed increasing the dose and patient was agreeable.        PLAN:   - INCREASE to Zepbound 7.5 mg once weekly injection.  Prescription sent to [facility] pharmacy to be mailed to patient   - Continue diet and lifestyle changes to assist with weight loss         Relevant Medications    tirzepatide, weight loss, (Zepbound) 7.5 mg/0.5 mL injection    Other Relevant Orders    Follow Up In [facility] - Pharmacy     Follow-up: 4 weeks     Tanja Mclain, PharmD    Continue all meds under the continuation of care with the referring provider and clinical pharmacy team.

## 2024-06-18 DIAGNOSIS — E66.01 CLASS 2 SEVERE OBESITY WITH SERIOUS COMORBIDITY AND BODY MASS INDEX (BMI) OF 39.0 TO 39.9 IN ADULT, UNSPECIFIED OBESITY TYPE (MULTI): ICD-10-CM

## 2024-06-25 ENCOUNTER — APPOINTMENT (OUTPATIENT)
Dept: PHARMACY | Facility: HOSPITAL | Age: 44
End: 2024-06-25
Payer: COMMERCIAL

## 2024-06-25 DIAGNOSIS — E66.01 CLASS 2 SEVERE OBESITY WITH SERIOUS COMORBIDITY AND BODY MASS INDEX (BMI) OF 39.0 TO 39.9 IN ADULT, UNSPECIFIED OBESITY TYPE (MULTI): ICD-10-CM

## 2024-06-25 RX ORDER — TIRZEPATIDE 7.5 MG/.5ML
7.5 INJECTION, SOLUTION SUBCUTANEOUS
Qty: 2 ML | Refills: 0 | OUTPATIENT
Start: 2024-06-25

## 2024-06-27 ENCOUNTER — APPOINTMENT (OUTPATIENT)
Dept: PRIMARY CARE | Facility: CLINIC | Age: 44
End: 2024-06-27
Payer: COMMERCIAL

## 2024-06-27 NOTE — PROGRESS NOTES
"      Patient ID: Juan Ramirez is a 43 y.o. male who presents for Weight Loss.    Referring Provider: Yamila Calderon MD  PCP: Yamila Calderon MD     Last visit with PCP: 4/4/2024   Next visit with PCP: 7/5/2024      Subjective     Starting Weight: 275 lbs   Current Weight: 245 lbs     HPI    -The 10-year ASCVD risk score (Juan NUNO, et al., 2019) is: 1.6%    Values used to calculate the score:      Age: 43 years      Sex: Male      Is Non- : No      Diabetic: No      Tobacco smoker: No      Systolic Blood Pressure: 138 mmHg      Is BP treated: No      HDL Cholesterol: 47.8 mg/dL      Total Cholesterol: 195 mg/dL     Pertinent PMH Review:  - PMH of Pancreatitis: No  - PMH/FH of Medullary Thyroid Cancer: No  - PMH of Retinopathy: No    Review of Systems      Objective     There were no vitals taken for this visit.   BP Readings from Last 4 Encounters:   04/04/24 138/84   12/27/23 123/77   06/08/23 119/68   12/02/22 162/80      There were no vitals filed for this visit.     Labs  Lab Results   Component Value Date    BILITOT 0.5 04/04/2024    CALCIUM 9.9 04/04/2024    CO2 29 04/04/2024     04/04/2024    CREATININE 0.92 04/04/2024    GLUCOSE 90 04/04/2024    ALKPHOS 61 04/04/2024    K 4.8 04/04/2024    PROT 6.8 04/04/2024     04/04/2024    AST 23 04/04/2024    ALT 65 (H) 04/04/2024    BUN 10 04/04/2024    ANIONGAP 14 04/04/2024    ALBUMIN 4.7 04/04/2024    GFRMALE 87 06/08/2023     Lab Results   Component Value Date    TRIG 183 (H) 04/04/2024    CHOL 195 04/04/2024    LDLCALC 111 (H) 04/04/2024    HDL 47.8 04/04/2024     No results found for: \"HGBA1C\"    Current Outpatient Medications   Medication Instructions    cholecalciferol (VITAMIN D3) 50,000 Units, oral, Once Weekly    FLUoxetine (PROZAC) 80 mg, oral, Daily    glucosamine HCl 750 mg tablet oral    rosuvastatin (CRESTOR) 5 mg, oral, Daily    tirzepatide (weight loss) (ZEPBOUND) 10 mg, subcutaneous, Every 7 days    ubidecarenone " (COENZYME Q10, BULK, MISC) oral       Drug Interactions;  None at time of review    Assessment/Plan   Problem List Items Addressed This Visit       Class 2 severe obesity with serious comorbidity and body mass index (BMI) of 39.0 to 39.9 in adult (Multi)     Patient has a BMI of 36 and was referred for initiation of a GLP- 1 agonist for weight loss.  Discussed options available including Wegovy, Zepbound, and Saxenda.  Patient's insurance does not cover weight loss injectable medications, however patient would still like to start the medication and use a copay card to help with cost.  Patient started increased dose of  Zepbound and reports some nausea with skipping to the higher dose but it only lasted with first few injections.  Discussed increasing the dose and patient was agreeable.        PLAN:   - INCREASE to Zepbound 10 mg once weekly injection.  Prescription sent to [facility] pharmacy to be mailed to patient   - Continue diet and lifestyle changes to assist with weight loss         Relevant Medications    tirzepatide, weight loss, (Zepbound) 10 mg/0.5 mL injection    Other Relevant Orders    Follow Up In [facility] - Pharmacy     Follow-up: 4 weeks     Tanja Mclain, PharmD    Continue all meds under the continuation of care with the referring provider and clinical pharmacy team.

## 2024-06-27 NOTE — ASSESSMENT & PLAN NOTE
Patient has a BMI of 36 and was referred for initiation of a GLP- 1 agonist for weight loss.  Discussed options available including Wegovy, Zepbound, and Saxenda.  Patient's insurance does not cover weight loss injectable medications, however patient would still like to start the medication and use a copay card to help with cost.  Patient started increased dose of  Zepbound and reports some nausea with skipping to the higher dose but it only lasted with first few injections.  Discussed increasing the dose and patient was agreeable.        PLAN:   - INCREASE to Zepbound 10 mg once weekly injection.  Prescription sent to [facility] pharmacy to be mailed to patient   - Continue diet and lifestyle changes to assist with weight loss

## 2024-07-03 DIAGNOSIS — E66.01 CLASS 2 SEVERE OBESITY WITH SERIOUS COMORBIDITY AND BODY MASS INDEX (BMI) OF 39.0 TO 39.9 IN ADULT, UNSPECIFIED OBESITY TYPE (MULTI): Primary | ICD-10-CM

## 2024-07-05 ENCOUNTER — LAB (OUTPATIENT)
Dept: LAB | Facility: LAB | Age: 44
End: 2024-07-05
Payer: COMMERCIAL

## 2024-07-05 ENCOUNTER — APPOINTMENT (OUTPATIENT)
Dept: PRIMARY CARE | Facility: CLINIC | Age: 44
End: 2024-07-05
Payer: COMMERCIAL

## 2024-07-05 VITALS
HEIGHT: 73 IN | BODY MASS INDEX: 33.24 KG/M2 | SYSTOLIC BLOOD PRESSURE: 106 MMHG | TEMPERATURE: 98.1 F | WEIGHT: 250.8 LBS | OXYGEN SATURATION: 98 % | DIASTOLIC BLOOD PRESSURE: 70 MMHG | HEART RATE: 79 BPM

## 2024-07-05 DIAGNOSIS — E55.9 VITAMIN D INSUFFICIENCY: ICD-10-CM

## 2024-07-05 DIAGNOSIS — F41.9 MILD ANXIETY: ICD-10-CM

## 2024-07-05 DIAGNOSIS — Z11.3 ROUTINE SCREENING FOR STI (SEXUALLY TRANSMITTED INFECTION): ICD-10-CM

## 2024-07-05 DIAGNOSIS — R11.0 NAUSEA: ICD-10-CM

## 2024-07-05 DIAGNOSIS — E66.01 CLASS 2 SEVERE OBESITY WITH SERIOUS COMORBIDITY AND BODY MASS INDEX (BMI) OF 39.0 TO 39.9 IN ADULT, UNSPECIFIED OBESITY TYPE (MULTI): ICD-10-CM

## 2024-07-05 DIAGNOSIS — E78.5 DYSLIPIDEMIA: ICD-10-CM

## 2024-07-05 DIAGNOSIS — Z00.00 PHYSICAL EXAM, ANNUAL: Primary | ICD-10-CM

## 2024-07-05 DIAGNOSIS — G47.00 INSOMNIA, UNSPECIFIED TYPE: ICD-10-CM

## 2024-07-05 DIAGNOSIS — E78.6 LOW HDL (UNDER 40): ICD-10-CM

## 2024-07-05 PROCEDURE — 87491 CHLMYD TRACH DNA AMP PROBE: CPT

## 2024-07-05 PROCEDURE — 87389 HIV-1 AG W/HIV-1&-2 AB AG IA: CPT

## 2024-07-05 PROCEDURE — 86780 TREPONEMA PALLIDUM: CPT

## 2024-07-05 PROCEDURE — 99396 PREV VISIT EST AGE 40-64: CPT | Performed by: FAMILY MEDICINE

## 2024-07-05 PROCEDURE — 82306 VITAMIN D 25 HYDROXY: CPT

## 2024-07-05 PROCEDURE — 1036F TOBACCO NON-USER: CPT | Performed by: FAMILY MEDICINE

## 2024-07-05 PROCEDURE — 36415 COLL VENOUS BLD VENIPUNCTURE: CPT

## 2024-07-05 PROCEDURE — 3008F BODY MASS INDEX DOCD: CPT | Performed by: FAMILY MEDICINE

## 2024-07-05 PROCEDURE — 86803 HEPATITIS C AB TEST: CPT

## 2024-07-05 PROCEDURE — 87591 N.GONORRHOEAE DNA AMP PROB: CPT

## 2024-07-05 PROCEDURE — RXMED WILLOW AMBULATORY MEDICATION CHARGE

## 2024-07-05 RX ORDER — FLUOXETINE HYDROCHLORIDE 20 MG/1
20 CAPSULE ORAL DAILY
Qty: 90 CAPSULE | Refills: 1 | Status: SHIPPED | OUTPATIENT
Start: 2024-07-05 | End: 2025-01-01

## 2024-07-05 RX ORDER — ONDANSETRON 8 MG/1
8 TABLET, ORALLY DISINTEGRATING ORAL EVERY 8 HOURS PRN
Qty: 20 TABLET | Refills: 0 | Status: SHIPPED | OUTPATIENT
Start: 2024-07-05 | End: 2024-07-12

## 2024-07-05 ASSESSMENT — PROMIS GLOBAL HEALTH SCALE
RATE_PHYSICAL_HEALTH: GOOD
EMOTIONAL_PROBLEMS: RARELY
RATE_QUALITY_OF_LIFE: GOOD
RATE_AVERAGE_FATIGUE: MILD
RATE_SOCIAL_SATISFACTION: GOOD
RATE_AVERAGE_PAIN: 0
RATE_GENERAL_HEALTH: GOOD
RATE_MENTAL_HEALTH: GOOD
CARRYOUT_PHYSICAL_ACTIVITIES: COMPLETELY
CARRYOUT_SOCIAL_ACTIVITIES: GOOD

## 2024-07-05 ASSESSMENT — ANXIETY QUESTIONNAIRES
2. NOT BEING ABLE TO STOP OR CONTROL WORRYING: NOT AT ALL
4. TROUBLE RELAXING: SEVERAL DAYS
1. FEELING NERVOUS, ANXIOUS, OR ON EDGE: NOT AT ALL
7. FEELING AFRAID AS IF SOMETHING AWFUL MIGHT HAPPEN: NOT AT ALL
IF YOU CHECKED OFF ANY PROBLEMS ON THIS QUESTIONNAIRE, HOW DIFFICULT HAVE THESE PROBLEMS MADE IT FOR YOU TO DO YOUR WORK, TAKE CARE OF THINGS AT HOME, OR GET ALONG WITH OTHER PEOPLE: NOT DIFFICULT AT ALL
3. WORRYING TOO MUCH ABOUT DIFFERENT THINGS: NOT AT ALL
GAD7 TOTAL SCORE: 1
6. BECOMING EASILY ANNOYED OR IRRITABLE: NOT AT ALL
5. BEING SO RESTLESS THAT IT IS HARD TO SIT STILL: NOT AT ALL

## 2024-07-05 ASSESSMENT — PATIENT HEALTH QUESTIONNAIRE - PHQ9
1. LITTLE INTEREST OR PLEASURE IN DOING THINGS: SEVERAL DAYS
2. FEELING DOWN, DEPRESSED OR HOPELESS: NOT AT ALL
4. FEELING TIRED OR HAVING LITTLE ENERGY: NOT AT ALL
3. TROUBLE FALLING OR STAYING ASLEEP OR SLEEPING TOO MUCH: NOT AT ALL
10. IF YOU CHECKED OFF ANY PROBLEMS, HOW DIFFICULT HAVE THESE PROBLEMS MADE IT FOR YOU TO DO YOUR WORK, TAKE CARE OF THINGS AT HOME, OR GET ALONG WITH OTHER PEOPLE: NOT DIFFICULT AT ALL
9. THOUGHTS THAT YOU WOULD BE BETTER OFF DEAD, OR OF HURTING YOURSELF: NOT AT ALL
6. FEELING BAD ABOUT YOURSELF - OR THAT YOU ARE A FAILURE OR HAVE LET YOURSELF OR YOUR FAMILY DOWN: NOT AT ALL
SUM OF ALL RESPONSES TO PHQ QUESTIONS 1-9: 1
SUM OF ALL RESPONSES TO PHQ9 QUESTIONS 1 AND 2: 1
5. POOR APPETITE OR OVEREATING: NOT AT ALL
7. TROUBLE CONCENTRATING ON THINGS, SUCH AS READING THE NEWSPAPER OR WATCHING TELEVISION: NOT AT ALL
8. MOVING OR SPEAKING SO SLOWLY THAT OTHER PEOPLE COULD HAVE NOTICED. OR THE OPPOSITE, BEING SO FIGETY OR RESTLESS THAT YOU HAVE BEEN MOVING AROUND A LOT MORE THAN USUAL: NOT AT ALL

## 2024-07-05 NOTE — PROGRESS NOTES
" Subjective   Patient ID: Juan Ramirez is a 43 y.o. male who presents for Annual Exam and Weight Loss (Pt is currently on zepbound which is working well but is asking for a medication for nausea and vomiting ).  HPI    Pt here for wellness .  Last CPE 1 year ago .       Interval Health :     Dec visit - rec decrease Fluoxetine from 80 to 40 mg   Feb visit- to discuss wt loss /    Regular visits with our clinical pharmacist  April 2024 -labwork     Interval Changes in PMHx. PSHx, FMHx : denies     Concerns/Questions:    Med for prn use for nausea/  STI screening/ check on vit D       Weight Goal  220#     Review of Systems  UTD on vision and dental exams   Feeling good/ exercising. No food cravings .overall feelinggood . Mood stable. Tolerated the Fluoxetine change  from80 m to 40 mg       Objective   /70   Pulse 79   Temp 36.7 °C (98.1 °F)   Ht 1.854 m (6' 1\")   Wt 114 kg (250 lb 12.8 oz)   SpO2 98%   BMI 33.09 kg/m²     Wt Readings from Last 4 Encounters:   07/05/24 114 kg (250 lb 12.8 oz)   04/04/24 124 kg (274 lb 6.4 oz)   12/27/23 128 kg (282 lb 3.2 oz)   06/08/23 116 kg (255 lb 4.8 oz)       Physical Exam  Vitals and nursing note reviewed.   Constitutional:       General: He is not in acute distress.     Appearance: Normal appearance.   Eyes:      Extraocular Movements: Extraocular movements intact.      Conjunctiva/sclera: Conjunctivae normal.      Pupils: Pupils are equal, round, and reactive to light.   Cardiovascular:      Heart sounds: Normal heart sounds.   Pulmonary:      Breath sounds: Normal breath sounds.   Abdominal:      General: Bowel sounds are normal.      Palpations: Abdomen is soft.   Musculoskeletal:         General: Normal range of motion.      Cervical back: Neck supple. No tenderness.   Lymphadenopathy:      Cervical: No cervical adenopathy.   Neurological:      General: No focal deficit present.      Mental Status: He is alert and oriented to person, place, and time. "   Psychiatric:         Mood and Affect: Mood normal.         Thought Content: Thought content normal.         Judgment: Judgment normal.       Assessment/Plan   Problem List Items Addressed This Visit          Medium    Mild anxiety    Relevant Medications    FLUoxetine (PROzac) 20 mg capsule    Low HDL (under 40)    Insomnia    Dyslipidemia    Relevant Orders    Comprehensive Metabolic Panel    Lipid Panel    Class 2 severe obesity with serious comorbidity and body mass index (BMI) of 39.0 to 39.9 in adult (Multi)     Other Visit Diagnoses       Physical exam, annual    -  Primary    Relevant Orders    Follow Up In [facility] - PCP    Follow Up In [facility] - PCP    Vitamin D insufficiency        Relevant Orders    Vitamin D 25-Hydroxy,Total (for eval of Vitamin D levels)    Vitamin D 25-Hydroxy,Total (for eval of Vitamin D levels)    Nausea        Relevant Medications    ondansetron ODT (Zofran-ODT) 8 mg disintegrating tablet    Routine screening for STI (sexually transmitted infection)        Relevant Orders    Hepatitis C Antibody    Syphilis Screen with Reflex    C. Trachomatis / N. Gonorrhoeae, Amplified Detection    HIV 1/2 Antigen/Antibody Screen with Reflex to Confirmation          Wellness  - preventive care and health maintenance reviewed and discussed     Anx / Dep   - Decreased fluoxetine to 20     Dyslipidemia   -maintain statin till next lab/ followup    Obesity  - agree w wt loss goal 220 #    - med for nausea for prn use     Orders   STI screen and Vit D level today      3 month followup  , wt loss,  lipid levels then and decide on statin then   FADY Calderon MD

## 2024-07-06 LAB
25(OH)D3 SERPL-MCNC: 68 NG/ML (ref 30–100)
C TRACH RRNA SPEC QL NAA+PROBE: NEGATIVE
HCV AB SER QL: NONREACTIVE
HIV 1+2 AB+HIV1 P24 AG SERPL QL IA: NONREACTIVE
N GONORRHOEA DNA SPEC QL PROBE+SIG AMP: NEGATIVE
TREPONEMA PALLIDUM IGG+IGM AB [PRESENCE] IN SERUM OR PLASMA BY IMMUNOASSAY: NONREACTIVE

## 2024-07-08 ENCOUNTER — PHARMACY VISIT (OUTPATIENT)
Dept: PHARMACY | Facility: CLINIC | Age: 44
End: 2024-07-08
Payer: COMMERCIAL

## 2024-07-23 ENCOUNTER — APPOINTMENT (OUTPATIENT)
Dept: PHARMACY | Facility: HOSPITAL | Age: 44
End: 2024-07-23
Payer: COMMERCIAL

## 2024-07-23 DIAGNOSIS — E66.01 CLASS 2 SEVERE OBESITY WITH SERIOUS COMORBIDITY AND BODY MASS INDEX (BMI) OF 39.0 TO 39.9 IN ADULT, UNSPECIFIED OBESITY TYPE (MULTI): ICD-10-CM

## 2024-07-23 NOTE — ASSESSMENT & PLAN NOTE
Patient has a BMI of 36 and was referred for initiation of a GLP- 1 agonist for weight loss.  Discussed options available including Wegovy, Zepbound, and Saxenda.  Patient's insurance does not cover weight loss injectable medications, however patient would still like to start the medication and use a copay card to help with cost.  Patient started increased dose of  Zepbound and reports some nausea but it is not bothersome at this time.  Will continue 3 more weeks of current dose as patient has the supply.      PLAN:   - Continue Zepbound 10 mg once weekly injection.    - Continue diet and lifestyle changes to assist with weight loss

## 2024-07-23 NOTE — PROGRESS NOTES
"      Patient ID: Juan Ramirez is a 43 y.o. male who presents for Weight Loss.    Referring Provider: Yamila Calderon MD  PCP: Yamila Calderon MD     Last visit with PCP: 4/4/2024   Next visit with PCP: 7/5/2024      Subjective     Starting Weight: 275 lbs   Current Weight: 245 lbs     HPI    -The 10-year ASCVD risk score (Juan NUNO, et al., 2019) is: 1%    Values used to calculate the score:      Age: 43 years      Sex: Male      Is Non- : No      Diabetic: No      Tobacco smoker: No      Systolic Blood Pressure: 106 mmHg      Is BP treated: No      HDL Cholesterol: 47.8 mg/dL      Total Cholesterol: 195 mg/dL     Pertinent PMH Review:  - PMH of Pancreatitis: No  - PMH/FH of Medullary Thyroid Cancer: No  - PMH of Retinopathy: No    Review of Systems      Objective     There were no vitals taken for this visit.   BP Readings from Last 4 Encounters:   07/05/24 106/70   04/04/24 138/84   12/27/23 123/77   06/08/23 119/68      There were no vitals filed for this visit.     Labs  Lab Results   Component Value Date    BILITOT 0.5 04/04/2024    CALCIUM 9.9 04/04/2024    CO2 29 04/04/2024     04/04/2024    CREATININE 0.92 04/04/2024    GLUCOSE 90 04/04/2024    ALKPHOS 61 04/04/2024    K 4.8 04/04/2024    PROT 6.8 04/04/2024     04/04/2024    AST 23 04/04/2024    ALT 65 (H) 04/04/2024    BUN 10 04/04/2024    ANIONGAP 14 04/04/2024    ALBUMIN 4.7 04/04/2024    GFRMALE 87 06/08/2023     Lab Results   Component Value Date    TRIG 183 (H) 04/04/2024    CHOL 195 04/04/2024    LDLCALC 111 (H) 04/04/2024    HDL 47.8 04/04/2024     No results found for: \"HGBA1C\"    Current Outpatient Medications   Medication Instructions    FLUoxetine (PROZAC) 20 mg, oral, Daily    glucosamine HCl 750 mg tablet oral    rosuvastatin (CRESTOR) 5 mg, oral, Daily    ubidecarenone (COENZYME Q10, BULK, MISC) oral    Zepbound 10 mg, subcutaneous, Every 7 days       Drug Interactions;  None at time of " review    Assessment/Plan   Problem List Items Addressed This Visit       Class 2 severe obesity with serious comorbidity and body mass index (BMI) of 39.0 to 39.9 in adult (Multi)     Patient has a BMI of 36 and was referred for initiation of a GLP- 1 agonist for weight loss.  Discussed options available including Wegovy, Zepbound, and Saxenda.  Patient's insurance does not cover weight loss injectable medications, however patient would still like to start the medication and use a copay card to help with cost.  Patient started increased dose of  Zepbound and reports some nausea but it is not bothersome at this time.  Will continue 3 more weeks of current dose as patient has the supply.      PLAN:   - Continue Zepbound 10 mg once weekly injection.    - Continue diet and lifestyle changes to assist with weight loss         Relevant Orders    Follow Up In [facility] - Pharmacy     Follow-up: 2 weeks     Tanja Mclain, PharmD    Continue all meds under the continuation of care with the referring provider and clinical pharmacy team.

## 2024-07-29 DIAGNOSIS — E66.01 CLASS 2 SEVERE OBESITY WITH SERIOUS COMORBIDITY AND BODY MASS INDEX (BMI) OF 39.0 TO 39.9 IN ADULT, UNSPECIFIED OBESITY TYPE (MULTI): ICD-10-CM

## 2024-07-29 RX ORDER — TIRZEPATIDE 10 MG/.5ML
10 INJECTION, SOLUTION SUBCUTANEOUS
Qty: 2 ML | Refills: 0 | OUTPATIENT
Start: 2024-07-29

## 2024-08-06 ENCOUNTER — APPOINTMENT (OUTPATIENT)
Dept: PHARMACY | Facility: HOSPITAL | Age: 44
End: 2024-08-06
Payer: COMMERCIAL

## 2024-08-06 DIAGNOSIS — E66.01 CLASS 2 SEVERE OBESITY WITH SERIOUS COMORBIDITY AND BODY MASS INDEX (BMI) OF 39.0 TO 39.9 IN ADULT, UNSPECIFIED OBESITY TYPE (MULTI): ICD-10-CM

## 2024-08-06 NOTE — PROGRESS NOTES
"      Patient ID: Juan Ramirez is a 43 y.o. male who presents for Weight Loss.    Referring Provider: Yamila Calderon MD  PCP: Yamila Calderon MD     Last visit with PCP: 4/4/2024   Next visit with PCP: 7/5/2024      Subjective     Starting Weight: 275 lbs   Current Weight: 240 lbs     HPI    -The 10-year ASCVD risk score (Juan NUNO, et al., 2019) is: 1%    Values used to calculate the score:      Age: 43 years      Sex: Male      Is Non- : No      Diabetic: No      Tobacco smoker: No      Systolic Blood Pressure: 106 mmHg      Is BP treated: No      HDL Cholesterol: 47.8 mg/dL      Total Cholesterol: 195 mg/dL     Pertinent PMH Review:  - PMH of Pancreatitis: No  - PMH/FH of Medullary Thyroid Cancer: No  - PMH of Retinopathy: No    Review of Systems      Objective     There were no vitals taken for this visit.   BP Readings from Last 4 Encounters:   07/05/24 106/70   04/04/24 138/84   12/27/23 123/77   06/08/23 119/68      There were no vitals filed for this visit.     Labs  Lab Results   Component Value Date    BILITOT 0.5 04/04/2024    CALCIUM 9.9 04/04/2024    CO2 29 04/04/2024     04/04/2024    CREATININE 0.92 04/04/2024    GLUCOSE 90 04/04/2024    ALKPHOS 61 04/04/2024    K 4.8 04/04/2024    PROT 6.8 04/04/2024     04/04/2024    AST 23 04/04/2024    ALT 65 (H) 04/04/2024    BUN 10 04/04/2024    ANIONGAP 14 04/04/2024    ALBUMIN 4.7 04/04/2024    GFRMALE 87 06/08/2023     Lab Results   Component Value Date    TRIG 183 (H) 04/04/2024    CHOL 195 04/04/2024    LDLCALC 111 (H) 04/04/2024    HDL 47.8 04/04/2024     No results found for: \"HGBA1C\"    Current Outpatient Medications   Medication Instructions    FLUoxetine (PROZAC) 20 mg, oral, Daily    glucosamine HCl 750 mg tablet oral    rosuvastatin (CRESTOR) 5 mg, oral, Daily    tirzepatide (weight loss) (ZEPBOUND) 10 mg, subcutaneous, Every 7 days    ubidecarenone (COENZYME Q10, BULK, MISC) oral       Drug Interactions;  None at time " of review    Assessment/Plan   Problem List Items Addressed This Visit       Class 2 severe obesity with serious comorbidity and body mass index (BMI) of 39.0 to 39.9 in adult (Multi)     Patient has a BMI of 36 and was referred for initiation of a GLP- 1 agonist for weight loss.  Discussed options available including Wegovy, Zepbound, and Saxenda.  Patient's insurance does not cover weight loss injectable medications, however patient would still like to start the medication and use a copay card to help with cost.  Patient started increased dose of  Zepbound and reports some nausea but it is not bothersome at this time.  Will continue with 10 mg dose as patient is still losing weight at this dose.      PLAN:   - Continue Zepbound 10 mg once weekly injection.    - Continue diet and lifestyle changes to assist with weight loss         Relevant Medications    tirzepatide, weight loss, (Zepbound) 10 mg/0.5 mL injection    Other Relevant Orders    Follow Up In [facility] - Pharmacy     Follow-up: 4 weeks     Tanja Mclain, PharmD    Continue all meds under the continuation of care with the referring provider and clinical pharmacy team.

## 2024-08-06 NOTE — ASSESSMENT & PLAN NOTE
Patient has a BMI of 36 and was referred for initiation of a GLP- 1 agonist for weight loss.  Discussed options available including Wegovy, Zepbound, and Saxenda.  Patient's insurance does not cover weight loss injectable medications, however patient would still like to start the medication and use a copay card to help with cost.  Patient started increased dose of  Zepbound and reports some nausea but it is not bothersome at this time.  Will continue with 10 mg dose as patient is still losing weight at this dose.      PLAN:   - Continue Zepbound 10 mg once weekly injection.    - Continue diet and lifestyle changes to assist with weight loss

## 2024-08-08 PROCEDURE — RXMED WILLOW AMBULATORY MEDICATION CHARGE

## 2024-08-09 ENCOUNTER — PHARMACY VISIT (OUTPATIENT)
Dept: PHARMACY | Facility: CLINIC | Age: 44
End: 2024-08-09
Payer: COMMERCIAL

## 2024-08-30 DIAGNOSIS — E66.01 CLASS 2 SEVERE OBESITY WITH SERIOUS COMORBIDITY AND BODY MASS INDEX (BMI) OF 39.0 TO 39.9 IN ADULT, UNSPECIFIED OBESITY TYPE (MULTI): ICD-10-CM

## 2024-08-30 RX ORDER — TIRZEPATIDE 10 MG/.5ML
10 INJECTION, SOLUTION SUBCUTANEOUS
Qty: 2 ML | Refills: 0 | Status: CANCELLED | OUTPATIENT
Start: 2024-08-30

## 2024-09-03 ENCOUNTER — APPOINTMENT (OUTPATIENT)
Dept: PHARMACY | Facility: HOSPITAL | Age: 44
End: 2024-09-03
Payer: COMMERCIAL

## 2024-09-03 DIAGNOSIS — E66.01 CLASS 2 SEVERE OBESITY WITH SERIOUS COMORBIDITY AND BODY MASS INDEX (BMI) OF 39.0 TO 39.9 IN ADULT, UNSPECIFIED OBESITY TYPE (MULTI): ICD-10-CM

## 2024-09-03 NOTE — PROGRESS NOTES
"      Patient ID: Juan Ramirez is a 44 y.o. male who presents for Weight Loss.    Referring Provider: Yamila Calderon MD  PCP: Yamila Calderno MD     Last visit with PCP: 4/4/2024   Next visit with PCP: 7/5/2024      Subjective     Starting Weight: 275 lbs   Current Weight: 240 lbs     HPI    -The 10-year ASCVD risk score (Juan NUNO, et al., 2019) is: 1.2%    Values used to calculate the score:      Age: 44 years      Sex: Male      Is Non- : No      Diabetic: No      Tobacco smoker: No      Systolic Blood Pressure: 106 mmHg      Is BP treated: No      HDL Cholesterol: 47.8 mg/dL      Total Cholesterol: 195 mg/dL     Pertinent PMH Review:  - PMH of Pancreatitis: No  - PMH/FH of Medullary Thyroid Cancer: No  - PMH of Retinopathy: No    Review of Systems      Objective     There were no vitals taken for this visit.   BP Readings from Last 4 Encounters:   07/05/24 106/70   04/04/24 138/84   12/27/23 123/77   06/08/23 119/68      There were no vitals filed for this visit.     Labs  Lab Results   Component Value Date    BILITOT 0.5 04/04/2024    CALCIUM 9.9 04/04/2024    CO2 29 04/04/2024     04/04/2024    CREATININE 0.92 04/04/2024    GLUCOSE 90 04/04/2024    ALKPHOS 61 04/04/2024    K 4.8 04/04/2024    PROT 6.8 04/04/2024     04/04/2024    AST 23 04/04/2024    ALT 65 (H) 04/04/2024    BUN 10 04/04/2024    ANIONGAP 14 04/04/2024    ALBUMIN 4.7 04/04/2024    GFRMALE 87 06/08/2023     Lab Results   Component Value Date    TRIG 183 (H) 04/04/2024    CHOL 195 04/04/2024    LDLCALC 111 (H) 04/04/2024    HDL 47.8 04/04/2024     No results found for: \"HGBA1C\"    Current Outpatient Medications   Medication Instructions    FLUoxetine (PROZAC) 20 mg, oral, Daily    glucosamine HCl 750 mg tablet oral    rosuvastatin (CRESTOR) 5 mg, oral, Daily    tirzepatide (weight loss) (ZEPBOUND) 12.5 mg, subcutaneous, Every 7 days    ubidecarenone (COENZYME Q10, BULK, MISC) oral       Drug Interactions;  None at " time of review    Assessment/Plan   Problem List Items Addressed This Visit       Class 2 severe obesity with serious comorbidity and body mass index (BMI) of 39.0 to 39.9 in adult (Multi)     Patient has a BMI of 36 and was referred for initiation of a GLP- 1 agonist for weight loss.  Discussed options available including Wegovy, Zepbound, and Saxenda.  Patient's insurance does not cover weight loss injectable medications, however patient would still like to start the medication and use a copay card to help with cost.  Since last visit patient reports no side effects and no changes in weight.  Discussed increasing the dose and patient was agreeable.      PLAN:   - INCREASE to Zepbound 12.5 mg once weekly injection. Prescription sent to [facility] pharmacy to be mailed.   - Continue diet and lifestyle changes to assist with weight loss         Relevant Medications    tirzepatide, weight loss, (Zepbound) 12.5 mg/0.5 mL injection    Other Relevant Orders    Follow Up In [facility] - Pharmacy     Follow-up: 6 weeks     Tanja Mclain, PharmD    Continue all meds under the continuation of care with the referring provider and clinical pharmacy team.

## 2024-09-03 NOTE — ASSESSMENT & PLAN NOTE
Patient has a BMI of 36 and was referred for initiation of a GLP- 1 agonist for weight loss.  Discussed options available including Wegovy, Zepbound, and Saxenda.  Patient's insurance does not cover weight loss injectable medications, however patient would still like to start the medication and use a copay card to help with cost.  Since last visit patient reports no side effects and no changes in weight.  Discussed increasing the dose and patient was agreeable.      PLAN:   - INCREASE to Zepbound 12.5 mg once weekly injection. Prescription sent to [facility] pharmacy to be mailed.   - Continue diet and lifestyle changes to assist with weight loss

## 2024-09-13 PROCEDURE — RXMED WILLOW AMBULATORY MEDICATION CHARGE

## 2024-09-20 ENCOUNTER — PHARMACY VISIT (OUTPATIENT)
Dept: PHARMACY | Facility: CLINIC | Age: 44
End: 2024-09-20
Payer: COMMERCIAL

## 2024-10-07 ENCOUNTER — APPOINTMENT (OUTPATIENT)
Dept: PRIMARY CARE | Facility: CLINIC | Age: 44
End: 2024-10-07
Payer: COMMERCIAL

## 2024-10-07 VITALS
HEIGHT: 73 IN | WEIGHT: 240.9 LBS | HEART RATE: 72 BPM | OXYGEN SATURATION: 97 % | SYSTOLIC BLOOD PRESSURE: 93 MMHG | DIASTOLIC BLOOD PRESSURE: 58 MMHG | BODY MASS INDEX: 31.93 KG/M2 | RESPIRATION RATE: 14 BRPM | TEMPERATURE: 97.9 F

## 2024-10-07 DIAGNOSIS — E78.6 LOW HDL (UNDER 40): ICD-10-CM

## 2024-10-07 DIAGNOSIS — R21 RASH: ICD-10-CM

## 2024-10-07 DIAGNOSIS — E66.812 CLASS 2 SEVERE OBESITY WITH SERIOUS COMORBIDITY AND BODY MASS INDEX (BMI) OF 39.0 TO 39.9 IN ADULT, UNSPECIFIED OBESITY TYPE: Primary | ICD-10-CM

## 2024-10-07 DIAGNOSIS — R11.0 NAUSEA: ICD-10-CM

## 2024-10-07 DIAGNOSIS — E66.01 CLASS 2 SEVERE OBESITY WITH SERIOUS COMORBIDITY AND BODY MASS INDEX (BMI) OF 39.0 TO 39.9 IN ADULT, UNSPECIFIED OBESITY TYPE: Primary | ICD-10-CM

## 2024-10-07 DIAGNOSIS — E78.5 DYSLIPIDEMIA: ICD-10-CM

## 2024-10-07 PROCEDURE — 99214 OFFICE O/P EST MOD 30 MIN: CPT | Performed by: FAMILY MEDICINE

## 2024-10-07 PROCEDURE — 3008F BODY MASS INDEX DOCD: CPT | Performed by: FAMILY MEDICINE

## 2024-10-07 PROCEDURE — 1036F TOBACCO NON-USER: CPT | Performed by: FAMILY MEDICINE

## 2024-10-07 RX ORDER — ROSUVASTATIN CALCIUM 5 MG/1
5 TABLET, COATED ORAL DAILY
Qty: 90 TABLET | Refills: 1 | Status: SHIPPED | OUTPATIENT
Start: 2024-10-07

## 2024-10-07 RX ORDER — NYSTATIN AND TRIAMCINOLONE ACETONIDE 100000; 1 [USP'U]/G; MG/G
CREAM TOPICAL 2 TIMES DAILY
Qty: 60 G | Refills: 1 | Status: SHIPPED | OUTPATIENT
Start: 2024-10-07

## 2024-10-07 RX ORDER — ONDANSETRON 8 MG/1
8 TABLET, ORALLY DISINTEGRATING ORAL EVERY 8 HOURS PRN
Qty: 20 TABLET | Refills: 0 | Status: SHIPPED | OUTPATIENT
Start: 2024-10-07 | End: 2024-10-14

## 2024-10-07 NOTE — PROGRESS NOTES
" Subjective   Patient ID: Juan Ramirez is a 44 y.o. male who presents for Weight Loss and Hyperlipidemia.  HPI  Weight loss followup    Last visit with me 7/2024   , CPE     Medication :  Zepbound    Starting wt  274 lbs   Goal  220 lbs     Side effects :  sometimes has nausea     Craves sugars less.  Appetite less.    Exercse: regular   Review of Systems  Rash left leg ,groin. Itchy. Not painful . No blisters. Using otc  lamisil, not improving.     Objective   BP 93/58 (BP Location: Right arm, Patient Position: Sitting, BP Cuff Size: Large adult)   Pulse 72   Temp 36.6 °C (97.9 °F) (Temporal)   Resp 14   Ht 1.854 m (6' 1\")   Wt 109 kg (240 lb 14.4 oz)   SpO2 97%   BMI 31.78 kg/m²     Physical Exam  Vitals reviewed.   Constitutional:       General: He is not in acute distress.  Cardiovascular:      Rate and Rhythm: Normal rate and regular rhythm.      Heart sounds: Normal heart sounds.   Pulmonary:      Effort: Pulmonary effort is normal.      Breath sounds: No wheezing or rales.   Neurological:      General: No focal deficit present.      Mental Status: He is alert.     Skin:  macular eruption, left groin , irreg border, no vesicle,  few satellite lesions   Wt Readings from Last 3 Encounters:   10/07/24 109 kg (240 lb 14.4 oz)   07/05/24 114 kg (250 lb 12.8 oz)   04/04/24 124 kg (274 lb 6.4 oz)           Assessment/Plan   Problem List Items Addressed This Visit          Medium    Class 2 severe obesity with serious comorbidity and body mass index (BMI) of 39.0 to 39.9 in adult - Primary    Dyslipidemia    Relevant Medications    rosuvastatin (Crestor) 5 mg tablet    Low HDL (under 40)    Relevant Orders    Follow Up In [facility] - PCP     Other Visit Diagnoses       Nausea        Relevant Medications    ondansetron ODT (Zofran-ODT) 8 mg disintegrating tablet    Rash        Relevant Medications    nystatin-triamcinolone (Mycolog II) cream          Obesity    - continues to lose wt   - nausea med " ordered  - he will continue to followup with Pharmacist     Rash    - appears to be yueast infx.  Tx ordered     FADY Calderon MD

## 2024-10-22 ENCOUNTER — APPOINTMENT (OUTPATIENT)
Dept: PHARMACY | Facility: HOSPITAL | Age: 44
End: 2024-10-22
Payer: COMMERCIAL

## 2024-10-22 DIAGNOSIS — E66.812 CLASS 2 SEVERE OBESITY WITH SERIOUS COMORBIDITY AND BODY MASS INDEX (BMI) OF 39.0 TO 39.9 IN ADULT, UNSPECIFIED OBESITY TYPE: ICD-10-CM

## 2024-10-22 DIAGNOSIS — E66.01 CLASS 2 SEVERE OBESITY WITH SERIOUS COMORBIDITY AND BODY MASS INDEX (BMI) OF 39.0 TO 39.9 IN ADULT, UNSPECIFIED OBESITY TYPE: ICD-10-CM

## 2024-10-22 PROCEDURE — RXMED WILLOW AMBULATORY MEDICATION CHARGE

## 2024-10-22 NOTE — PROGRESS NOTES
"      Patient ID: Juan Ramirez is a 44 y.o. male who presents for Weight Loss.    Referring Provider: Yamila Calderon MD  PCP: Yamila Calderon MD     Last visit with PCP: 10/7/2024  Next visit with PCP: 1/8/2025      Subjective     Starting Weight: 275 lbs   Current Weight: 235 lbs   GOAL Weight: 220 lbs     HPI    -The 10-year ASCVD risk score (Juan NUNO, et al., 2019) is: 0.9%    Values used to calculate the score:      Age: 44 years      Sex: Male      Is Non- : No      Diabetic: No      Tobacco smoker: No      Systolic Blood Pressure: 93 mmHg      Is BP treated: No      HDL Cholesterol: 47.8 mg/dL      Total Cholesterol: 195 mg/dL     Pertinent PMH Review:  - PMH of Pancreatitis: No  - PMH/FH of Medullary Thyroid Cancer: No  - PMH of Retinopathy: No    Review of Systems      Objective     There were no vitals taken for this visit.   BP Readings from Last 4 Encounters:   10/07/24 93/58   07/05/24 106/70   04/04/24 138/84   12/27/23 123/77      There were no vitals filed for this visit.     Labs  Lab Results   Component Value Date    BILITOT 0.5 04/04/2024    CALCIUM 9.9 04/04/2024    CO2 29 04/04/2024     04/04/2024    CREATININE 0.92 04/04/2024    GLUCOSE 90 04/04/2024    ALKPHOS 61 04/04/2024    K 4.8 04/04/2024    PROT 6.8 04/04/2024     04/04/2024    AST 23 04/04/2024    ALT 65 (H) 04/04/2024    BUN 10 04/04/2024    ANIONGAP 14 04/04/2024    ALBUMIN 4.7 04/04/2024    GFRMALE 87 06/08/2023     Lab Results   Component Value Date    TRIG 183 (H) 04/04/2024    CHOL 195 04/04/2024    LDLCALC 111 (H) 04/04/2024    HDL 47.8 04/04/2024     No results found for: \"HGBA1C\"    Current Outpatient Medications   Medication Instructions    FLUoxetine (PROZAC) 20 mg, oral, Daily    glucosamine HCl 750 mg tablet oral    nystatin-triamcinolone (Mycolog II) cream Topical, 2 times daily, Apply to affect area twice a day for 7-14 days then as needed for rash.    rosuvastatin (CRESTOR) 5 mg, oral, " Daily    tirzepatide (weight loss) (ZEPBOUND) 15 mg, subcutaneous, Every 7 days    ubidecarenone (COENZYME Q10, BULK, MISC) oral       Drug Interactions;  None at time of review    Assessment/Plan   Problem List Items Addressed This Visit       Class 2 severe obesity with serious comorbidity and body mass index (BMI) of 39.0 to 39.9 in adult     Patient has a BMI of 36 and was referred for initiation of a GLP- 1 agonist for weight loss.  Discussed options available including Wegovy, Zepbound, and Saxenda.  Patient's insurance does not cover weight loss injectable medications, however patient would still like to start the medication and use a copay card to help with cost.  Since last visit patient reports no side effects and has lost about 5 lbs.  Discussed increasing the dose and patient was agreeable.      PLAN:   - INCREASE to Zepbound 15 mg once weekly injection. Prescription sent to [facility] pharmacy to be mailed.   - Continue diet and lifestyle changes to assist with weight loss         Relevant Medications    tirzepatide, weight loss, (Zepbound) 15 mg/0.5 mL injection    Other Relevant Orders    Referral to Clinical Pharmacy     Follow-up: 4 weeks     Tanja Mclain, PharmD    Continue all meds under the continuation of care with the referring provider and clinical pharmacy team.

## 2024-10-22 NOTE — ASSESSMENT & PLAN NOTE
Patient has a BMI of 36 and was referred for initiation of a GLP- 1 agonist for weight loss.  Discussed options available including Wegovy, Zepbound, and Saxenda.  Patient's insurance does not cover weight loss injectable medications, however patient would still like to start the medication and use a copay card to help with cost.  Since last visit patient reports no side effects and has lost about 5 lbs.  Discussed increasing the dose and patient was agreeable.      PLAN:   - INCREASE to Zepbound 15 mg once weekly injection. Prescription sent to [facility] pharmacy to be mailed.   - Continue diet and lifestyle changes to assist with weight loss

## 2024-10-24 ENCOUNTER — PHARMACY VISIT (OUTPATIENT)
Dept: PHARMACY | Facility: CLINIC | Age: 44
End: 2024-10-24
Payer: COMMERCIAL

## 2024-11-19 ENCOUNTER — APPOINTMENT (OUTPATIENT)
Dept: PHARMACY | Facility: HOSPITAL | Age: 44
End: 2024-11-19
Payer: COMMERCIAL

## 2024-12-13 ENCOUNTER — APPOINTMENT (OUTPATIENT)
Dept: PHARMACY | Facility: HOSPITAL | Age: 44
End: 2024-12-13
Payer: COMMERCIAL

## 2024-12-13 DIAGNOSIS — E66.01 CLASS 2 SEVERE OBESITY WITH SERIOUS COMORBIDITY AND BODY MASS INDEX (BMI) OF 39.0 TO 39.9 IN ADULT, UNSPECIFIED OBESITY TYPE: ICD-10-CM

## 2024-12-13 DIAGNOSIS — E66.812 CLASS 2 SEVERE OBESITY WITH SERIOUS COMORBIDITY AND BODY MASS INDEX (BMI) OF 39.0 TO 39.9 IN ADULT, UNSPECIFIED OBESITY TYPE: ICD-10-CM

## 2024-12-13 NOTE — ASSESSMENT & PLAN NOTE
Patient has a BMI of 36 and was referred for initiation of a GLP- 1 agonist for weight loss.  Discussed options available including Wegovy, Zepbound, and Saxenda.  Patient's insurance does not cover weight loss injectable medications, however patient would still like to start the medication and use a copay card to help with cost.  Since last visit patient reports no side effects and has lost about 5 lbs.  Will continue with current regimen as patient is tolerating and continuing to lose weight.      PLAN:   - Continue Zepbound 15 mg once weekly injection. Prescription sent to [facility] pharmacy to be mailed.   - Continue diet and lifestyle changes to assist with weight loss

## 2024-12-13 NOTE — PROGRESS NOTES
"      Patient ID: Juan Ramirez is a 44 y.o. male who presents for Weight Loss.    Referring Provider: Yamila Calderon MD  PCP: Yamila Calderon MD     Last visit with PCP: 10/7/2024  Next visit with PCP: 1/8/2025      Subjective     Starting Weight: 275 lbs   Current Weight: 230 lbs   GOAL Weight: 220 lbs     HPI    -The 10-year ASCVD risk score (Juan NUNO, et al., 2019) is: 0.9%    Values used to calculate the score:      Age: 44 years      Sex: Male      Is Non- : No      Diabetic: No      Tobacco smoker: No      Systolic Blood Pressure: 93 mmHg      Is BP treated: No      HDL Cholesterol: 47.8 mg/dL      Total Cholesterol: 195 mg/dL     Pertinent PMH Review:  - PMH of Pancreatitis: No  - PMH/FH of Medullary Thyroid Cancer: No  - PMH of Retinopathy: No    Review of Systems      Objective     There were no vitals taken for this visit.   BP Readings from Last 4 Encounters:   10/07/24 93/58   07/05/24 106/70   04/04/24 138/84   12/27/23 123/77      There were no vitals filed for this visit.     Labs  Lab Results   Component Value Date    BILITOT 0.5 04/04/2024    CALCIUM 9.9 04/04/2024    CO2 29 04/04/2024     04/04/2024    CREATININE 0.92 04/04/2024    GLUCOSE 90 04/04/2024    ALKPHOS 61 04/04/2024    K 4.8 04/04/2024    PROT 6.8 04/04/2024     04/04/2024    AST 23 04/04/2024    ALT 65 (H) 04/04/2024    BUN 10 04/04/2024    ANIONGAP 14 04/04/2024    ALBUMIN 4.7 04/04/2024    GFRMALE 87 06/08/2023     Lab Results   Component Value Date    TRIG 183 (H) 04/04/2024    CHOL 195 04/04/2024    LDLCALC 111 (H) 04/04/2024    HDL 47.8 04/04/2024     No results found for: \"HGBA1C\"    Current Outpatient Medications   Medication Instructions    FLUoxetine (PROZAC) 20 mg, oral, Daily    glucosamine HCl 750 mg tablet oral    nystatin-triamcinolone (Mycolog II) cream Topical, 2 times daily, Apply to affect area twice a day for 7-14 days then as needed for rash.    rosuvastatin (CRESTOR) 5 mg, oral, " Daily    ubidecarenone (COENZYME Q10, BULK, MISC) oral    Zepbound 15 mg, subcutaneous, Every 7 days       Drug Interactions;  None at time of review    Assessment/Plan   Problem List Items Addressed This Visit       Class 2 severe obesity with serious comorbidity and body mass index (BMI) of 39.0 to 39.9 in adult     Patient has a BMI of 36 and was referred for initiation of a GLP- 1 agonist for weight loss.  Discussed options available including Wegovy, Zepbound, and Saxenda.  Patient's insurance does not cover weight loss injectable medications, however patient would still like to start the medication and use a copay card to help with cost.  Since last visit patient reports no side effects and has lost about 5 lbs.  Will continue with current regimen as patient is tolerating and continuing to lose weight.      PLAN:   - Continue Zepbound 15 mg once weekly injection. Prescription sent to [facility] pharmacy to be mailed.   - Continue diet and lifestyle changes to assist with weight loss         Relevant Orders    Referral to Clinical Pharmacy     Follow-up: 3 months per patient request     Tanja Mclain, PharmD    Continue all meds under the continuation of care with the referring provider and clinical pharmacy team.

## 2024-12-22 PROCEDURE — RXMED WILLOW AMBULATORY MEDICATION CHARGE

## 2025-01-02 ENCOUNTER — PHARMACY VISIT (OUTPATIENT)
Dept: PHARMACY | Facility: CLINIC | Age: 45
End: 2025-01-02
Payer: COMMERCIAL

## 2025-01-06 ENCOUNTER — LAB (OUTPATIENT)
Dept: LAB | Facility: LAB | Age: 45
End: 2025-01-06
Payer: COMMERCIAL

## 2025-01-06 DIAGNOSIS — E78.5 DYSLIPIDEMIA: ICD-10-CM

## 2025-01-06 DIAGNOSIS — E55.9 VITAMIN D INSUFFICIENCY: ICD-10-CM

## 2025-01-06 LAB
25(OH)D3 SERPL-MCNC: 36 NG/ML (ref 30–100)
ALBUMIN SERPL BCP-MCNC: 5.1 G/DL (ref 3.4–5)
ALP SERPL-CCNC: 76 U/L (ref 33–120)
ALT SERPL W P-5'-P-CCNC: 39 U/L (ref 10–52)
ANION GAP SERPL CALC-SCNC: 14 MMOL/L (ref 10–20)
AST SERPL W P-5'-P-CCNC: 18 U/L (ref 9–39)
BILIRUB SERPL-MCNC: 0.9 MG/DL (ref 0–1.2)
BUN SERPL-MCNC: 10 MG/DL (ref 6–23)
CALCIUM SERPL-MCNC: 10.4 MG/DL (ref 8.6–10.6)
CHLORIDE SERPL-SCNC: 103 MMOL/L (ref 98–107)
CHOLEST SERPL-MCNC: 197 MG/DL (ref 0–199)
CHOLESTEROL/HDL RATIO: 4.1
CO2 SERPL-SCNC: 29 MMOL/L (ref 21–32)
CREAT SERPL-MCNC: 1.15 MG/DL (ref 0.5–1.3)
EGFRCR SERPLBLD CKD-EPI 2021: 81 ML/MIN/1.73M*2
GLUCOSE SERPL-MCNC: 83 MG/DL (ref 74–99)
HDLC SERPL-MCNC: 48.2 MG/DL
LDLC SERPL CALC-MCNC: 123 MG/DL
NON HDL CHOLESTEROL: 149 MG/DL (ref 0–149)
POTASSIUM SERPL-SCNC: 4.6 MMOL/L (ref 3.5–5.3)
PROT SERPL-MCNC: 7.5 G/DL (ref 6.4–8.2)
SODIUM SERPL-SCNC: 141 MMOL/L (ref 136–145)
TRIGL SERPL-MCNC: 131 MG/DL (ref 0–149)
VLDL: 26 MG/DL (ref 0–40)

## 2025-01-06 PROCEDURE — 80053 COMPREHEN METABOLIC PANEL: CPT

## 2025-01-06 PROCEDURE — 82306 VITAMIN D 25 HYDROXY: CPT

## 2025-01-06 PROCEDURE — 80061 LIPID PANEL: CPT

## 2025-01-08 ENCOUNTER — APPOINTMENT (OUTPATIENT)
Dept: PRIMARY CARE | Facility: CLINIC | Age: 45
End: 2025-01-08
Payer: COMMERCIAL

## 2025-01-08 VITALS
DIASTOLIC BLOOD PRESSURE: 76 MMHG | TEMPERATURE: 98.4 F | OXYGEN SATURATION: 97 % | HEART RATE: 75 BPM | WEIGHT: 232.5 LBS | BODY MASS INDEX: 30.67 KG/M2 | SYSTOLIC BLOOD PRESSURE: 113 MMHG | RESPIRATION RATE: 14 BRPM

## 2025-01-08 DIAGNOSIS — E78.6 LOW HDL (UNDER 40): ICD-10-CM

## 2025-01-08 DIAGNOSIS — R11.0 NAUSEA: ICD-10-CM

## 2025-01-08 PROCEDURE — 1036F TOBACCO NON-USER: CPT | Performed by: FAMILY MEDICINE

## 2025-01-08 PROCEDURE — 99213 OFFICE O/P EST LOW 20 MIN: CPT | Performed by: FAMILY MEDICINE

## 2025-01-08 RX ORDER — ONDANSETRON 8 MG/1
8 TABLET, ORALLY DISINTEGRATING ORAL EVERY 8 HOURS PRN
Qty: 20 TABLET | Refills: 0 | Status: SHIPPED | OUTPATIENT
Start: 2025-01-08 | End: 2025-01-15

## 2025-01-08 NOTE — PROGRESS NOTES
Subjective   Patient ID: Juan Ramirez is a 44 y.o. male who presents for Follow-up (Fuv for weight loss. Pt states no concerns./Pt declines flu vaccine today.).  HPI  Wt loss followup  , dyslipidemia ; last vist 10/ 2024 ; labwork normal.   Medication :  Zepbound    Starting wt  274 lbs   Goal  220 lbs     Per home scale-  225lb   Review of Systems  Exercising . Feeling well . Recently went on a cruise w his dtr (age 10)  and went well .     Objective   /76 (BP Location: Right arm, Patient Position: Sitting, BP Cuff Size: Large adult)   Pulse 75   Temp 36.9 °C (98.4 °F) (Temporal)   Resp 14   Wt 105 kg (232 lb 8 oz)   SpO2 97%   BMI 30.67 kg/m²     Physical Exam  Vitals reviewed.   Constitutional:       General: He is not in acute distress.  Cardiovascular:      Rate and Rhythm: Normal rate and regular rhythm.      Heart sounds: Normal heart sounds.   Pulmonary:      Effort: Pulmonary effort is normal.      Breath sounds: No wheezing or rales.   Neurological:      General: No focal deficit present.      Mental Status: He is alert.       Wt Readings from Last 4 Encounters:   01/08/25 105 kg (232 lb 8 oz)   10/07/24 109 kg (240 lb 14.4 oz)   07/05/24 114 kg (250 lb 12.8 oz)   04/04/24 124 kg (274 lb 6.4 oz)       Assessment/Plan   Problem List Items Addressed This Visit          Medium    Low HDL (under 40)    Relevant Orders    Follow Up In [facility] - PCP    CBC and Auto Differential    Comprehensive Metabolic Panel    Lipid Panel     Other Visit Diagnoses       Nausea        Relevant Medications    ondansetron ODT (Zofran-ODT) 8 mg disintegrating tablet           Wt loss-continues to make progress to goal. Nausea is intermittent, when first takes does of med.     No change in plan.   Continue current tx's / rxs  .     Followup 6 mo with lab prior.       FADY Calderon MD

## 2025-01-23 PROCEDURE — RXMED WILLOW AMBULATORY MEDICATION CHARGE

## 2025-01-24 ENCOUNTER — PHARMACY VISIT (OUTPATIENT)
Dept: PHARMACY | Facility: CLINIC | Age: 45
End: 2025-01-24
Payer: COMMERCIAL

## 2025-03-07 ENCOUNTER — APPOINTMENT (OUTPATIENT)
Dept: PHARMACY | Facility: HOSPITAL | Age: 45
End: 2025-03-07
Payer: COMMERCIAL

## 2025-03-24 DIAGNOSIS — E78.5 DYSLIPIDEMIA: ICD-10-CM

## 2025-03-24 DIAGNOSIS — F41.9 MILD ANXIETY: ICD-10-CM

## 2025-03-24 RX ORDER — ROSUVASTATIN CALCIUM 5 MG/1
5 TABLET, COATED ORAL DAILY
Qty: 90 TABLET | Refills: 1 | Status: SHIPPED | OUTPATIENT
Start: 2025-03-24

## 2025-03-24 RX ORDER — FLUOXETINE HYDROCHLORIDE 20 MG/1
20 CAPSULE ORAL DAILY
Qty: 90 CAPSULE | Refills: 1 | Status: SHIPPED | OUTPATIENT
Start: 2025-03-24

## 2025-05-06 ENCOUNTER — APPOINTMENT (OUTPATIENT)
Dept: PHARMACY | Facility: HOSPITAL | Age: 45
End: 2025-05-06
Payer: COMMERCIAL

## 2025-05-06 DIAGNOSIS — E66.812 CLASS 2 SEVERE OBESITY WITH SERIOUS COMORBIDITY AND BODY MASS INDEX (BMI) OF 39.0 TO 39.9 IN ADULT, UNSPECIFIED OBESITY TYPE: ICD-10-CM

## 2025-05-06 DIAGNOSIS — E66.01 CLASS 2 SEVERE OBESITY WITH SERIOUS COMORBIDITY AND BODY MASS INDEX (BMI) OF 39.0 TO 39.9 IN ADULT, UNSPECIFIED OBESITY TYPE: ICD-10-CM

## 2025-05-08 PROCEDURE — RXMED WILLOW AMBULATORY MEDICATION CHARGE

## 2025-05-10 ENCOUNTER — PHARMACY VISIT (OUTPATIENT)
Dept: PHARMACY | Facility: CLINIC | Age: 45
End: 2025-05-10
Payer: COMMERCIAL

## 2025-05-12 NOTE — PROGRESS NOTES
"      Patient ID: Juan Ramirez is a 44 y.o. male who presents for Weight Loss.    Referring Provider: Yamila Calderon MD  PCP: Yamila Calderon MD     Last visit with PCP: 1/8/2025  Next visit with PCP: 7/9/2025    Subjective     Starting Weight: 275 lbs   Current Weight: 230 lbs   GOAL Weight: 220 lbs     HPI    -The 10-year ASCVD risk score (Juan NUNO, et al., 2019) is: 1.3%    Values used to calculate the score:      Age: 44 years      Sex: Male      Is Non- : No      Diabetic: No      Tobacco smoker: No      Systolic Blood Pressure: 113 mmHg      Is BP treated: No      HDL Cholesterol: 48.2 mg/dL      Total Cholesterol: 197 mg/dL     Pertinent PMH Review:  - PMH of Pancreatitis: No  - PMH/FH of Medullary Thyroid Cancer: No  - PMH of Retinopathy: No    Review of Systems      Objective     There were no vitals taken for this visit.   BP Readings from Last 4 Encounters:   01/08/25 113/76   10/07/24 93/58   07/05/24 106/70   04/04/24 138/84      There were no vitals filed for this visit.     Labs  Lab Results   Component Value Date    BILITOT 0.9 01/06/2025    CALCIUM 10.4 01/06/2025    CO2 29 01/06/2025     01/06/2025    CREATININE 1.15 01/06/2025    GLUCOSE 83 01/06/2025    ALKPHOS 76 01/06/2025    K 4.6 01/06/2025    PROT 7.5 01/06/2025     01/06/2025    AST 18 01/06/2025    ALT 39 01/06/2025    BUN 10 01/06/2025    ANIONGAP 14 01/06/2025    ALBUMIN 5.1 (H) 01/06/2025    GFRMALE 87 06/08/2023     Lab Results   Component Value Date    TRIG 131 01/06/2025    CHOL 197 01/06/2025    LDLCALC 123 (H) 01/06/2025    HDL 48.2 01/06/2025     No results found for: \"HGBA1C\"    Current Outpatient Medications   Medication Instructions    FLUoxetine (PROZAC) 20 mg, oral, Daily    glucosamine HCl 750 mg tablet Take by mouth.    nystatin-triamcinolone (Mycolog II) cream Topical, 2 times daily, Apply to affect area twice a day for 7-14 days then as needed for rash.    rosuvastatin (CRESTOR) 5 mg, " oral, Daily    tirzepatide (weight loss) (ZEPBOUND) 15 mg, subcutaneous, Every 7 days    ubidecarenone (COENZYME Q10, BULK, MISC) Take by mouth.       Drug Interactions;  None at time of review    Assessment/Plan   Problem List Items Addressed This Visit       Class 2 severe obesity with serious comorbidity and body mass index (BMI) of 39.0 to 39.9 in adult    Patient has a BMI of 36 and was referred for initiation of a GLP- 1 agonist for weight loss.  Discussed options available including Wegovy, Zepbound, and Saxenda.  Patient's insurance does not cover weight loss injectable medications, however patient would still like to start the medication and use a copay card to help with cost.  Since last visit patient reports no side effects and has lost about 5 lbs.  Will continue with current regimen as patient is tolerating and continuing to lose weight.      PLAN:   - Continue Zepbound 15 mg once weekly injection. Prescription sent to [facility] pharmacy to be mailed.   - Continue diet and lifestyle changes to assist with weight loss         Relevant Medications    tirzepatide, weight loss, (Zepbound) 15 mg/0.5 mL injection    Other Relevant Orders    Referral to Clinical Pharmacy     Follow-up: 2 months per patient request     Tanja Mclain, PharmD    Continue all meds under the continuation of care with the referring provider and clinical pharmacy team.

## 2025-05-12 NOTE — ASSESSMENT & PLAN NOTE
Patient has a BMI of 36 and was referred for initiation of a GLP- 1 agonist for weight loss.  Discussed options available including Wegovy, Zepbound, and Saxenda.  Patient's insurance does not cover weight loss injectable medications, however patient would still like to start the medication and use a copay card to help with cost.  Since last visit patient reports no side effects and has lost about 5 lbs.  Will continue with current regimen as patient is tolerating and continuing to lose weight.      PLAN:   - Continue Zepbound 15 mg once weekly injection. Prescription sent to [facility] pharmacy to be mailed.   - Continue diet and lifestyle changes to assist with weight loss

## 2025-07-09 ENCOUNTER — APPOINTMENT (OUTPATIENT)
Dept: PRIMARY CARE | Facility: CLINIC | Age: 45
End: 2025-07-09
Payer: COMMERCIAL

## 2025-07-09 VITALS
RESPIRATION RATE: 14 BRPM | DIASTOLIC BLOOD PRESSURE: 64 MMHG | OXYGEN SATURATION: 98 % | WEIGHT: 204.6 LBS | SYSTOLIC BLOOD PRESSURE: 116 MMHG | HEART RATE: 54 BPM | BODY MASS INDEX: 26.99 KG/M2 | TEMPERATURE: 97.3 F

## 2025-07-09 DIAGNOSIS — E78.5 DYSLIPIDEMIA: ICD-10-CM

## 2025-07-09 DIAGNOSIS — E66.01 CLASS 2 SEVERE OBESITY WITH SERIOUS COMORBIDITY AND BODY MASS INDEX (BMI) OF 39.0 TO 39.9 IN ADULT, UNSPECIFIED OBESITY TYPE: ICD-10-CM

## 2025-07-09 DIAGNOSIS — E66.811 CLASS 1 OBESITY DUE TO EXCESS CALORIES WITH SERIOUS COMORBIDITY AND BODY MASS INDEX (BMI) OF 30.0 TO 30.9 IN ADULT: ICD-10-CM

## 2025-07-09 DIAGNOSIS — E66.09 CLASS 1 OBESITY DUE TO EXCESS CALORIES WITH SERIOUS COMORBIDITY AND BODY MASS INDEX (BMI) OF 30.0 TO 30.9 IN ADULT: ICD-10-CM

## 2025-07-09 DIAGNOSIS — E78.6 LOW HDL (UNDER 40): ICD-10-CM

## 2025-07-09 DIAGNOSIS — Z00.00 PHYSICAL EXAM, ANNUAL: Primary | ICD-10-CM

## 2025-07-09 DIAGNOSIS — Z86.0100 HISTORY OF COLON POLYPS: ICD-10-CM

## 2025-07-09 DIAGNOSIS — E66.812 CLASS 2 SEVERE OBESITY WITH SERIOUS COMORBIDITY AND BODY MASS INDEX (BMI) OF 39.0 TO 39.9 IN ADULT, UNSPECIFIED OBESITY TYPE: ICD-10-CM

## 2025-07-09 LAB
ALBUMIN SERPL-MCNC: 4.7 G/DL (ref 3.6–5.1)
ALP SERPL-CCNC: 56 U/L (ref 36–130)
ALT SERPL-CCNC: 24 U/L (ref 9–46)
ANION GAP SERPL CALCULATED.4IONS-SCNC: 7 MMOL/L (CALC) (ref 7–17)
AST SERPL-CCNC: 17 U/L (ref 10–40)
BASOPHILS # BLD AUTO: 29 CELLS/UL (ref 0–200)
BASOPHILS NFR BLD AUTO: 0.7 %
BILIRUB SERPL-MCNC: 0.7 MG/DL (ref 0.2–1.2)
BUN SERPL-MCNC: 7 MG/DL (ref 7–25)
CALCIUM SERPL-MCNC: 9.5 MG/DL (ref 8.6–10.3)
CHLORIDE SERPL-SCNC: 107 MMOL/L (ref 98–110)
CHOLEST SERPL-MCNC: 139 MG/DL
CHOLEST/HDLC SERPL: 2.5 (CALC)
CO2 SERPL-SCNC: 28 MMOL/L (ref 20–32)
CREAT SERPL-MCNC: 0.9 MG/DL (ref 0.6–1.29)
EGFRCR SERPLBLD CKD-EPI 2021: 109 ML/MIN/1.73M2
EOSINOPHIL # BLD AUTO: 164 CELLS/UL (ref 15–500)
EOSINOPHIL NFR BLD AUTO: 3.9 %
ERYTHROCYTE [DISTWIDTH] IN BLOOD BY AUTOMATED COUNT: 12.3 % (ref 11–15)
GLUCOSE SERPL-MCNC: 86 MG/DL (ref 65–99)
HCT VFR BLD AUTO: 48.6 % (ref 38.5–50)
HDLC SERPL-MCNC: 55 MG/DL
HGB BLD-MCNC: 16 G/DL (ref 13.2–17.1)
LDLC SERPL CALC-MCNC: 70 MG/DL (CALC)
LYMPHOCYTES # BLD AUTO: 1802 CELLS/UL (ref 850–3900)
LYMPHOCYTES NFR BLD AUTO: 42.9 %
MCH RBC QN AUTO: 30.6 PG (ref 27–33)
MCHC RBC AUTO-ENTMCNC: 32.9 G/DL (ref 32–36)
MCV RBC AUTO: 92.9 FL (ref 80–100)
MONOCYTES # BLD AUTO: 252 CELLS/UL (ref 200–950)
MONOCYTES NFR BLD AUTO: 6 %
NEUTROPHILS # BLD AUTO: 1953 CELLS/UL (ref 1500–7800)
NEUTROPHILS NFR BLD AUTO: 46.5 %
NONHDLC SERPL-MCNC: 84 MG/DL (CALC)
PLATELET # BLD AUTO: 188 THOUSAND/UL (ref 140–400)
PMV BLD REES-ECKER: 9.2 FL (ref 7.5–12.5)
POTASSIUM SERPL-SCNC: 4.3 MMOL/L (ref 3.5–5.3)
PROT SERPL-MCNC: 6.7 G/DL (ref 6.1–8.1)
RBC # BLD AUTO: 5.23 MILLION/UL (ref 4.2–5.8)
SODIUM SERPL-SCNC: 142 MMOL/L (ref 135–146)
TRIGL SERPL-MCNC: 62 MG/DL
WBC # BLD AUTO: 4.2 THOUSAND/UL (ref 3.8–10.8)

## 2025-07-09 PROCEDURE — 99396 PREV VISIT EST AGE 40-64: CPT | Performed by: FAMILY MEDICINE

## 2025-07-09 PROCEDURE — 1036F TOBACCO NON-USER: CPT | Performed by: FAMILY MEDICINE

## 2025-07-09 ASSESSMENT — ANXIETY QUESTIONNAIRES
3. WORRYING TOO MUCH ABOUT DIFFERENT THINGS: NOT AT ALL
7. FEELING AFRAID AS IF SOMETHING AWFUL MIGHT HAPPEN: NOT AT ALL
4. TROUBLE RELAXING: NOT AT ALL
6. BECOMING EASILY ANNOYED OR IRRITABLE: NOT AT ALL
5. BEING SO RESTLESS THAT IT IS HARD TO SIT STILL: NOT AT ALL
2. NOT BEING ABLE TO STOP OR CONTROL WORRYING: NOT AT ALL
1. FEELING NERVOUS, ANXIOUS, OR ON EDGE: NOT AT ALL
GAD7 TOTAL SCORE: 0

## 2025-07-09 ASSESSMENT — PATIENT HEALTH QUESTIONNAIRE - PHQ9
2. FEELING DOWN, DEPRESSED OR HOPELESS: NOT AT ALL
9. THOUGHTS THAT YOU WOULD BE BETTER OFF DEAD, OR OF HURTING YOURSELF: NOT AT ALL
SUM OF ALL RESPONSES TO PHQ9 QUESTIONS 1 AND 2: 0
4. FEELING TIRED OR HAVING LITTLE ENERGY: NOT AT ALL
6. FEELING BAD ABOUT YOURSELF - OR THAT YOU ARE A FAILURE OR HAVE LET YOURSELF OR YOUR FAMILY DOWN: NOT AT ALL
8. MOVING OR SPEAKING SO SLOWLY THAT OTHER PEOPLE COULD HAVE NOTICED. OR THE OPPOSITE, BEING SO FIGETY OR RESTLESS THAT YOU HAVE BEEN MOVING AROUND A LOT MORE THAN USUAL: NOT AT ALL
SUM OF ALL RESPONSES TO PHQ QUESTIONS 1-9: 0
3. TROUBLE FALLING OR STAYING ASLEEP OR SLEEPING TOO MUCH: NOT AT ALL
1. LITTLE INTEREST OR PLEASURE IN DOING THINGS: NOT AT ALL
7. TROUBLE CONCENTRATING ON THINGS, SUCH AS READING THE NEWSPAPER OR WATCHING TELEVISION: NOT AT ALL
5. POOR APPETITE OR OVEREATING: NOT AT ALL

## 2025-07-09 NOTE — PROGRESS NOTES
Subjective   Patient ID: Juan Ramriez is a 44 y.o. male who presents for Annual Exam      HPI  Pt here for wellness .  Last CPE 1 year ago .       Patient Active Problem List   Diagnosis    Allergic rhinitis, mild    Dyslipidemia    History of colon polyps    Insomnia    Low HDL (under 40)    Mild anxiety    Class 2 severe obesity with serious comorbidity and body mass index (BMI) of 39.0 to 39.9 in adult         Current Outpatient Medications   Medication Sig Dispense Refill    FLUoxetine (PROzac) 20 mg capsule TAKE 1 CAPSULE BY MOUTH DAILY 90 capsule 1    glucosamine HCl 750 mg tablet Take by mouth.      nystatin-triamcinolone (Mycolog II) cream Apply topically 2 times a day. Apply to affect area twice a day for 7-14 days then as needed for rash. 60 g 1    rosuvastatin (Crestor) 5 mg tablet Take 1 tablet (5 mg) by mouth once daily. 90 tablet 1    ubidecarenone (COENZYME Q10, BULK, MISC) Take by mouth.      tirzepatide, weight loss, (Zepbound) 15 mg/0.5 mL injection Inject 15 mg under the skin every 7 days. 4 each 3     No current facility-administered medications for this visit.       Interval Health :   very good.  Strss/anxiety none . Wondering if can stop fluoxetine .    Interval Changes in PMHx. PSHx, FMHx : none     Concerns/Questions: regarding duration of wt loss injection. On it for a month. Is at goal.  Exercisign, feeling well  overall .      Review of Systems    Occasional nausea at time of dosing of wt loss injection   Current with dental, vision checks     Objective   /64 (BP Location: Left arm, Patient Position: Sitting, BP Cuff Size: Adult)   Pulse 54   Temp 36.3 °C (97.3 °F) (Temporal)   Resp 14   Wt 92.8 kg (204 lb 9.6 oz)   SpO2 98%   BMI 26.99 kg/m²     Physical Exam  Vitals and nursing note reviewed.   Constitutional:       General: He is not in acute distress.     Appearance: Normal appearance.   Cardiovascular:      Heart sounds: Normal heart sounds.   Pulmonary:      Breath  sounds: Normal breath sounds.   Abdominal:      General: Bowel sounds are normal.      Palpations: Abdomen is soft.   Musculoskeletal:         General: Normal range of motion.      Cervical back: Neck supple.   Neurological:      General: No focal deficit present.      Mental Status: He is alert and oriented to person, place, and time.   Psychiatric:         Mood and Affect: Mood normal.         Thought Content: Thought content normal.         Judgment: Judgment normal.            Assessment/Plan   Problem List Items Addressed This Visit          Medium    Class 2 severe obesity with serious comorbidity and body mass index (BMI) of 39.0 to 39.9 in adult    Relevant Medications    tirzepatide, weight loss, (Zepbound) 15 mg/0.5 mL injection    Dyslipidemia    Relevant Orders    Lipid Panel    History of colon polyps    Low HDL (under 40)     Other Visit Diagnoses         Physical exam, annual    -  Primary    Relevant Orders    Follow Up In [facility] - PCP      Class 1 obesity due to excess calories with serious comorbidity and body mass index (BMI) of 30.0 to 30.9 in adult                   Wellness  - preventive care and health maintenance reviewed and discussed   Has had significant wt loss and we will continue current tx for maintenance.  I recommend up to a year.   Will check labwork off rosuvastatin . Fluoxetine can take every other day till finished.       Stop fluoxetine and rosuvastatin    Repeat lipid in a few months.     Followup annual .    FADY Calderon MD

## 2025-07-10 ENCOUNTER — APPOINTMENT (OUTPATIENT)
Dept: PHARMACY | Facility: HOSPITAL | Age: 45
End: 2025-07-10
Payer: COMMERCIAL

## 2026-07-16 ENCOUNTER — APPOINTMENT (OUTPATIENT)
Dept: PRIMARY CARE | Facility: CLINIC | Age: 46
End: 2026-07-16
Payer: COMMERCIAL